# Patient Record
Sex: FEMALE | Race: WHITE | Employment: FULL TIME | URBAN - METROPOLITAN AREA
[De-identification: names, ages, dates, MRNs, and addresses within clinical notes are randomized per-mention and may not be internally consistent; named-entity substitution may affect disease eponyms.]

---

## 2017-03-28 ENCOUNTER — HOSPITAL ENCOUNTER (OUTPATIENT)
Dept: SURGERY | Age: 57
Discharge: HOME | End: 2017-03-28

## 2017-03-28 VITALS
BODY MASS INDEX: 27.16 KG/M2 | DIASTOLIC BLOOD PRESSURE: 88 MMHG | RESPIRATION RATE: 20 BRPM | TEMPERATURE: 98.3 F | HEART RATE: 100 BPM | OXYGEN SATURATION: 98 % | WEIGHT: 169 LBS | SYSTOLIC BLOOD PRESSURE: 133 MMHG | HEIGHT: 66 IN

## 2017-03-28 RX ORDER — BACLOFEN 20 MG
250 TABLET ORAL DAILY
COMMUNITY

## 2017-03-28 RX ORDER — LAMOTRIGINE 100 MG/1
100 TABLET ORAL
COMMUNITY

## 2017-03-28 RX ORDER — BISMUTH SUBSALICYLATE 262 MG
1 TABLET,CHEWABLE ORAL DAILY
COMMUNITY

## 2017-03-28 NOTE — PERIOP NOTES
Patient verified name, , and surgery as listed in [facility]. TYPE  CASE:1B  Orders per surgeon: none  Labs per surgeon:done at Dr Elliott Ards office today  Labs per anesthesia protocol: none  EKG  :  Patient denies any EKG in the past several years, she also denies any hx of chest pain, OROZCO or CAD      Patient provided with handouts including guide to surgery , transfusions, pain management and hand hygiene for the family and community. Pt verbalizes understanding of all pre-op instructions . Instructed that family must be present in building at all times. Hibiclens and instructions given per hospital policy. Instructed patient to continue  previous medications as prescribed prior to surgery and  to take the following medications the day of surgery according to anesthesia guidelines : none       Original medication prescription bottles vitamins visualized during patient appointment. Continue all previous medications unless otherwise directed. Instructed patient to hold  the following medications prior to surgery: patient takes her medications at hs      Patient verbalized understanding of all instructions and provided all medical/health information to the best of their ability.

## 2017-04-01 ENCOUNTER — ANESTHESIA EVENT (OUTPATIENT)
Dept: SURGERY | Age: 57
End: 2017-04-01
Payer: COMMERCIAL

## 2017-04-02 NOTE — H&P
Sindi Bajwa   History and physical    Subjective  Problem List:.   1. Right hand pain. 2. 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 3. right DeQuervain's tenosynovitis. 4. right knee pain/meniscal tear. This 64year old female presents today for evaluation of right knee pain. The patient comes in today for evaluation, history and physical, and surgical consent signing. The surgical procedure was reviewed in detail with the patient. The risks, including but not limited to anesthesia, infection, deep vein thrombosis, pulmonary embolus, injury to vessels, tendons and nerves, paralysis, stroke, heart attack, loss of limb, and death were discussed. The patient understands the post operative course and all questions were answered. No guarantees are made and all alternatives are given. The patient wishes to proceed with the surgery. Appropriate literature and relevant material was reviewed with the patient. Surgical procedure: right knee arthroscopy with menisectomy and microfracture. .     Allergies: Medrol dose dillon, codeine, Demerol, sulfa. .   Family health history: heart disease-father and grandparent, high cholesterol-mother. .   Major events: Heent-tonsils, hysterectomy. Ongoing medical problems: anxiety, depression, sleep apnea-uses CPAP, high cholesterol, osteoarthritis, migraines, visual problems. .   Social history: Patient does use tobacco and ETOH use occasionally. Patient is . Jaymie Rodriguez REVIEW OF SYSTEMS:.   General: Denies weight change, generally healthy,  change in strength or exercise tolerance. .   Head: Denies headaches,  vertigo,  injury. .   Eyes: Patient wears glasses. .   Ears:  Denies change in hearing,  tinnitus, bleeding, vertigo. .   Nose: Denies epistaxis,  coryza, obstruction,  discharge. .   Mouth: Denies dental difficulties,  gingival bleeding,  use of dentures. .   Chest: Denies dyspnea, wheezing, hemoptysis, cough. .   Heart: Denies chest pains,  palpitations, syncope,  orthopnea. .   Abdomen: Denies change in appetite,  dysphagia, abdominal pains, bowel habit changes, emesis, melena. .   : Denies urinary urgency,  dysuria, change in nature of urine. .   Neurologic: Denies weakness, denies tremor,  seizures, changes in mentation,  ataxia. Psychiatric: Denies depressive symptoms, changes in sleep habits,  changes in thought content. .     Objective  Vital signs: Height 65 inches; Weight 168 lbs; /98 mmHg; Temp 97.3 F; Pulse 110 bpm; Oxygen Saturation 95 %. .     Patient is a 64year old female who appears her given age and is in no apparent distress. Oriented to person, place, and time. Mood and affect are appropriate for age and situation. Assessment of respiratory effort reveals even and nonlabored respirations. .     GEN: NAD. Lungs clear to auscultation bilaterally. Heart rate regular without murmur heard. .     She exhibits a mildly antalgic, reciprocal gait. She is able to get onto and off of the examination table. Right knee MRI on 3-8-17 revealed:.   1. Osteochondral injury of the medial femoral condyle. .   2. Chondromalacia of the patella with evidence of chondral edema of the medial facet with moderate grade chondromalacia of the superior lateral quadrant of the patella with underlying mild patchy marrow edema. .   3. Free edge attenuation and body of the medial meniscus. .   4. Free edge tear of the lateral meniscus. .   5. No full thickness tear of the ACL or PCL. .   6. Small reactive joint effusion with a small amount of fluid in the popliteal pouch. .       Right knee examination:. Inspection reveals no external signs of acute injury or trauma. She has several well healed tattoos noted over the right lower leg. No palpable cords. No warmth or erythema noted. No effusion noted. Palpation reveals tenderness over the medial joint line, and with focal tenderness noted over the medial femoral condyle. Knee extension (active): 0 degrees, with pain.    Knee flexion (active): 115 degrees, with pain. The right knee is stable to stressing in all planes. Positive for patellofemoral crepitus with knee flexion/extension. Patella exam: normal tracking. Muscle tone is normal, without evidence of atrophy noted. She is able to bear full weight on her right knee. Neurologic: Sensation is intact and symmetrical in all dermatomes. .   Vascular: Peripheral pulses normal 2/2 lower extremities. .   Deep tendon reflexes in the lower extremities are normal and symmetrical.   Coordination is good. Assessment  right knee pain/internal derangement. right knee chondromalacia. medial meniscus attenuation. lateral meniscus tear. osteochondral injury, medial femoral condyle. DIAGNOSIS:        Other tear of lateral meniscus of right knee, current injury, sequela [ICD-10: S83.281S], [ICD-9: 905.7], [SNOMED: 781919082]        Other specified acquired deformities of musculoskeletal system [ICD-10: M95.8], [ICD-9: 738.8], [SNOMED: 99314956]        Medial meniscus derangement [ICD-10: M23.303], [ICD-9: 717.3], [SNOMED: 339750766]        Chondromalacia, right knee [ICD-10: M94.261], [ICD-9: 733.92], [SNOMED: 10914557]  Plan  We discussed the pathophysiology of the diagnosis and options for treatment. .     We have talked about the complications of surgery, including the possibility of damage to nerves, arteries, vessels and tendons, bleeding, infection, the possibility of sustaining medical problems, even death. We have talked about the possibility that the condition may not improve after surgery  or that it could actually be worse. She seems to understand and accept these possible complications. .       All questions answered at this time. Patient knows to contact the office with any questions or concerns. .     VERIFICATION OF ANCILLARY DOCUMENTATION:  The portions of the chart completed by ancillary personnel were reviewed by the physician. .     RTC: post-op. Arvell Jude      MEDICATIONS:        Cymbalta 60 MG Oral Capsule Delayed Release Particles 1 capsule (60 mg) orally daily                    prescription:   not prescribed this visit        Cyclobenzaprine HCl 10 MG Oral Tablet as needed                    prescription:   not prescribed this visit        Magnesium 200 MG Oral Tablet one daily                    prescription:   not prescribed this visit        Multivitamins Oral Capsule one daily                    prescription:   not prescribed this visit        ZyrTEC Allergy 10 MG Oral Tablet one daily                    prescription:   not prescribed this visit        LaMICtal 100 MG Oral Tablet one daily                    prescription:   not prescribed this visit

## 2017-04-04 ENCOUNTER — HOSPITAL ENCOUNTER (OUTPATIENT)
Age: 57
Setting detail: OUTPATIENT SURGERY
Discharge: HOME | End: 2017-04-04
Attending: ORTHOPAEDIC SURGERY | Admitting: ORTHOPAEDIC SURGERY
Payer: COMMERCIAL

## 2017-04-04 ENCOUNTER — ANESTHESIA (OUTPATIENT)
Dept: SURGERY | Age: 57
End: 2017-04-04
Payer: COMMERCIAL

## 2017-04-04 VITALS
RESPIRATION RATE: 16 BRPM | HEART RATE: 98 BPM | TEMPERATURE: 98.6 F | SYSTOLIC BLOOD PRESSURE: 126 MMHG | BODY MASS INDEX: 27.44 KG/M2 | DIASTOLIC BLOOD PRESSURE: 82 MMHG | WEIGHT: 170 LBS | OXYGEN SATURATION: 94 %

## 2017-04-04 PROCEDURE — 76210000063 HC OR PH I REC FIRST 0.5 HR: Performed by: ORTHOPAEDIC SURGERY

## 2017-04-04 PROCEDURE — 76060000032 HC ANESTHESIA 0.5 TO 1 HR: Performed by: ORTHOPAEDIC SURGERY

## 2017-04-04 PROCEDURE — 77030018836 HC SOL IRR NACL ICUM -A: Performed by: ORTHOPAEDIC SURGERY

## 2017-04-04 PROCEDURE — 77030002916 HC SUT ETHLN J&J -A: Performed by: ORTHOPAEDIC SURGERY

## 2017-04-04 PROCEDURE — 74011250636 HC RX REV CODE- 250/636

## 2017-04-04 PROCEDURE — 74011250637 HC RX REV CODE- 250/637: Performed by: ANESTHESIOLOGY

## 2017-04-04 PROCEDURE — 77030020753 HC CUF TRNQT 1BLA STRY -B: Performed by: ORTHOPAEDIC SURGERY

## 2017-04-04 PROCEDURE — 77030019940 HC BLNKT HYPOTHRM STRY -B: Performed by: ANESTHESIOLOGY

## 2017-04-04 PROCEDURE — 74011250636 HC RX REV CODE- 250/636: Performed by: ORTHOPAEDIC SURGERY

## 2017-04-04 PROCEDURE — 77030022036 HC BLD SHV TOMCAT STRY -B: Performed by: ORTHOPAEDIC SURGERY

## 2017-04-04 PROCEDURE — 77030020143 HC AIRWY LARYN INTUB CGAS -A: Performed by: ANESTHESIOLOGY

## 2017-04-04 PROCEDURE — 74011000250 HC RX REV CODE- 250

## 2017-04-04 PROCEDURE — 77030029203 HC IRR KT CYSTO/TUR BBMI -A: Performed by: ORTHOPAEDIC SURGERY

## 2017-04-04 PROCEDURE — 76010000138 HC OR TIME 0.5 TO 1 HR: Performed by: ORTHOPAEDIC SURGERY

## 2017-04-04 PROCEDURE — 74011000250 HC RX REV CODE- 250: Performed by: ANESTHESIOLOGY

## 2017-04-04 PROCEDURE — 74011250636 HC RX REV CODE- 250/636: Performed by: ANESTHESIOLOGY

## 2017-04-04 PROCEDURE — 77030019908 HC STETH ESOPH SIMS -A: Performed by: ANESTHESIOLOGY

## 2017-04-04 PROCEDURE — 76210000020 HC REC RM PH II FIRST 0.5 HR: Performed by: ORTHOPAEDIC SURGERY

## 2017-04-04 RX ORDER — SODIUM CHLORIDE, SODIUM LACTATE, POTASSIUM CHLORIDE, CALCIUM CHLORIDE 600; 310; 30; 20 MG/100ML; MG/100ML; MG/100ML; MG/100ML
75 INJECTION, SOLUTION INTRAVENOUS CONTINUOUS
Status: DISCONTINUED | OUTPATIENT
Start: 2017-04-04 | End: 2017-04-04 | Stop reason: HOSPADM

## 2017-04-04 RX ORDER — FENTANYL CITRATE 50 UG/ML
INJECTION, SOLUTION INTRAMUSCULAR; INTRAVENOUS AS NEEDED
Status: DISCONTINUED | OUTPATIENT
Start: 2017-04-04 | End: 2017-04-04 | Stop reason: HOSPADM

## 2017-04-04 RX ORDER — ONDANSETRON 2 MG/ML
INJECTION INTRAMUSCULAR; INTRAVENOUS AS NEEDED
Status: DISCONTINUED | OUTPATIENT
Start: 2017-04-04 | End: 2017-04-04 | Stop reason: HOSPADM

## 2017-04-04 RX ORDER — KETOROLAC TROMETHAMINE 30 MG/ML
INJECTION, SOLUTION INTRAMUSCULAR; INTRAVENOUS AS NEEDED
Status: DISCONTINUED | OUTPATIENT
Start: 2017-04-04 | End: 2017-04-04 | Stop reason: HOSPADM

## 2017-04-04 RX ORDER — MIDAZOLAM HYDROCHLORIDE 1 MG/ML
2 INJECTION, SOLUTION INTRAMUSCULAR; INTRAVENOUS
Status: DISCONTINUED | OUTPATIENT
Start: 2017-04-04 | End: 2017-04-04 | Stop reason: HOSPADM

## 2017-04-04 RX ORDER — EPINEPHRINE 1 MG/ML
INJECTION, SOLUTION, CONCENTRATE INTRAVENOUS AS NEEDED
Status: DISCONTINUED | OUTPATIENT
Start: 2017-04-04 | End: 2017-04-04 | Stop reason: HOSPADM

## 2017-04-04 RX ORDER — LIDOCAINE HYDROCHLORIDE 10 MG/ML
0.1 INJECTION INFILTRATION; PERINEURAL AS NEEDED
Status: DISCONTINUED | OUTPATIENT
Start: 2017-04-04 | End: 2017-04-04 | Stop reason: HOSPADM

## 2017-04-04 RX ORDER — LIDOCAINE HYDROCHLORIDE 20 MG/ML
INJECTION, SOLUTION EPIDURAL; INFILTRATION; INTRACAUDAL; PERINEURAL AS NEEDED
Status: DISCONTINUED | OUTPATIENT
Start: 2017-04-04 | End: 2017-04-04 | Stop reason: HOSPADM

## 2017-04-04 RX ORDER — NALOXONE HYDROCHLORIDE 0.4 MG/ML
0.2 INJECTION, SOLUTION INTRAMUSCULAR; INTRAVENOUS; SUBCUTANEOUS AS NEEDED
Status: DISCONTINUED | OUTPATIENT
Start: 2017-04-04 | End: 2017-04-04 | Stop reason: HOSPADM

## 2017-04-04 RX ORDER — ROPIVACAINE HYDROCHLORIDE 5 MG/ML
INJECTION, SOLUTION EPIDURAL; INFILTRATION; PERINEURAL AS NEEDED
Status: DISCONTINUED | OUTPATIENT
Start: 2017-04-04 | End: 2017-04-04 | Stop reason: HOSPADM

## 2017-04-04 RX ORDER — SODIUM CHLORIDE 0.9 % (FLUSH) 0.9 %
5-10 SYRINGE (ML) INJECTION AS NEEDED
Status: DISCONTINUED | OUTPATIENT
Start: 2017-04-04 | End: 2017-04-04 | Stop reason: HOSPADM

## 2017-04-04 RX ORDER — FAMOTIDINE 20 MG/1
20 TABLET, FILM COATED ORAL ONCE
Status: COMPLETED | OUTPATIENT
Start: 2017-04-04 | End: 2017-04-04

## 2017-04-04 RX ORDER — PROPOFOL 10 MG/ML
INJECTION, EMULSION INTRAVENOUS AS NEEDED
Status: DISCONTINUED | OUTPATIENT
Start: 2017-04-04 | End: 2017-04-04 | Stop reason: HOSPADM

## 2017-04-04 RX ORDER — OXYCODONE AND ACETAMINOPHEN 5; 325 MG/1; MG/1
1 TABLET ORAL AS NEEDED
Status: DISCONTINUED | OUTPATIENT
Start: 2017-04-04 | End: 2017-04-04 | Stop reason: HOSPADM

## 2017-04-04 RX ORDER — HYDROMORPHONE HYDROCHLORIDE 2 MG/ML
0.5 INJECTION, SOLUTION INTRAMUSCULAR; INTRAVENOUS; SUBCUTANEOUS
Status: DISCONTINUED | OUTPATIENT
Start: 2017-04-04 | End: 2017-04-04 | Stop reason: HOSPADM

## 2017-04-04 RX ADMIN — SODIUM CHLORIDE 3 MG: 9 INJECTION INTRAMUSCULAR; INTRAVENOUS; SUBCUTANEOUS at 09:01

## 2017-04-04 RX ADMIN — MIDAZOLAM HYDROCHLORIDE 2 MG: 1 INJECTION, SOLUTION INTRAMUSCULAR; INTRAVENOUS at 06:55

## 2017-04-04 RX ADMIN — FENTANYL CITRATE 50 MCG: 50 INJECTION, SOLUTION INTRAMUSCULAR; INTRAVENOUS at 07:37

## 2017-04-04 RX ADMIN — SODIUM CHLORIDE, SODIUM LACTATE, POTASSIUM CHLORIDE, AND CALCIUM CHLORIDE 75 ML/HR: 600; 310; 30; 20 INJECTION, SOLUTION INTRAVENOUS at 06:02

## 2017-04-04 RX ADMIN — FAMOTIDINE 20 MG: 20 TABLET, FILM COATED ORAL at 06:02

## 2017-04-04 RX ADMIN — PROPOFOL 200 MG: 10 INJECTION, EMULSION INTRAVENOUS at 07:37

## 2017-04-04 RX ADMIN — LIDOCAINE HYDROCHLORIDE 40 MG: 20 INJECTION, SOLUTION EPIDURAL; INFILTRATION; INTRACAUDAL; PERINEURAL at 07:37

## 2017-04-04 RX ADMIN — KETOROLAC TROMETHAMINE 30 MG: 30 INJECTION, SOLUTION INTRAMUSCULAR; INTRAVENOUS at 08:17

## 2017-04-04 RX ADMIN — ONDANSETRON 4 MG: 2 INJECTION INTRAMUSCULAR; INTRAVENOUS at 08:16

## 2017-04-04 NOTE — OP NOTES
[facility] 65   OPERATIVE REPORT       Name:  Wyatt Castano   MR#:  [id number]   :  1960   Account #:  [de-identified]   Date of Adm:  2017       DATE OF SURGERY: 2017     PREOPERATIVE DIAGNOSIS: Right knee medial femoral condyle   chondromalacia. POSTPROCEDURE DIAGNOSES:    1. Right knee grade 3-4 chondromalacia over defined area. 2. Lateral meniscus degenerative tear. PROCEDURE: Diagnostic and therapeutic arthroscopy with right   knee arthroscopy with partial lateral meniscectomy and medial   femoral condyle cartilage abrasion and microfracture with drill   and microfracture tool. ANESTHESIA: General.    SURGEON: Tran Rod MD    INDICATIONS: Ms. Freddy Hernández is a 51-year-old female with a history of   painful right knee. MRI findings consistent with significant   chondromalacia of the medial femoral condyle. We discussed   treatment options and felt that a microfracture technique would   be about the only suitable treatment for this. She understands   and wishes to proceed. She understands that this may improve or   actually make her pain worse. Pain could scar cartilage in this   area or not and the ultimate outcome may be knee replacement. She understands and wishes to proceed. She also understands the   risks to include infection, blood clots, nerve and tendon   damage, and other less common potentially issues, complication   may occur. Informed consent was obtained. PROCEDURE IN DETAIL: The patient was seen in the preoperative   area; her knee was marked. We discussed any concerns she had. She was taken to operating room #7, successful anesthesia was   achieved. No antibiotics were indicated for this procedure. A   time-out was performed, once the leg was prepped and draped into   a sterile field. Once time-out was confirmed by the operative   team, we exsanguinated with a 6 inch sterile Esmarch, tourniquet   was inflated to 300 mmHg.  2 portals were made after instillation   of half of the 30 mL of Naropin with epinephrine, 15 mL in divided   doses around the proposed portal sites and intraarticularly. Portals were made anterolateral and anteromedial at the level of   the joint. The knee was inspected in a systematic fashion. No   significant pathology seen in the lateral gutter or the lateral   suprapatellar pouch area or the medial suprapatellar pouch area. The lateral femoral condyle had some grade 1 chondromalacia. There was a degenerative tear at the junction of the anterior   and posterior horns which did extend up about a third of the way   into the body of the meniscus. This debrided down alternating with   straight baskets and a mechanized shaver. There was no   significant chondromalacia seen on the lateral tibial articular   surface. The ACL was visualized, stressed, and probed and found   to be intact, as well as the PCL. We placed the scope on to the   medial femoral condyle. The medial tibial articular surface was   intact. The medial meniscus was intact to probing and   visualization. There was a significant area, approximately 8 x 6   mm in size of grade 3-4 chondromalacia with large flaps   extending down to the bone. We debrided these loose flaps with   the mechanized 3.5 mm shaver and then we drilled this 0.045 wire   and with the microfracture awl. Once we removed bony debris and   cartilage from this area down to bone, we had approximately 7 to   8 small puncture areas in the cortical bone. She had some   chondromalacia deep in the trochlear groove. No significant   flaps. This was probed as well. No other areas noted. The medial   gutter was free of any significant pathology and we thoroughly   irrigated the knee and then instilled the remaining 15 mL of   Naropin with epinephrine for a total of 30 mL. We then closed the portals with simple sutures of 3-0 Dermalon   plus a sterile bulky dressing.  The tourniquet was released,   taken to the recovery room in satisfactory condition. She will be discharged to followup in my office in 10 days, call   sooner if any problems. Given appropriate pain medication   prescriptions.         MD Robb Silva / Immanuel Nieto   D:  04/04/2017   08:29   T:  04/04/2017   08:53   Job #:  [id number]

## 2017-04-04 NOTE — PROGRESS NOTES
's Pre-op visit requested by patient. Conveyed care and concern for patient and family. Offered prayer as requested for patient, family, and staff.     Mae Funes MDiv, BS  Board Certified

## 2017-04-04 NOTE — H&P
History and Physical Updated with no interval change. Rosita Victor MD History and Physical Updated with no interval change.  Rosita Victor MD

## 2017-04-04 NOTE — ANESTHESIA POSTPROCEDURE EVALUATION
Post-Anesthesia Evaluation and Assessment    Patient: Umair Duenas MRN: [id number]  SSN: [id number]    YOB: 1960  Age: 64 y.o. Sex: female       Cardiovascular Function/Vital Signs  Visit Vitals    /82    Pulse 98    Temp 37 °C (98.6 °F)    Resp 16    Wt 77.1 kg (170 lb)    SpO2 94%    BMI 27.44 kg/m2       Patient is status post general anesthesia for Procedure(s):  RIGHT  KNEE ARTHROSCOPY WITH MEDIAL AND LATERAL MENISECTOMY AND MICROFRACTURE . Nausea/Vomiting: None    Postoperative hydration reviewed and adequate. Pain:  Pain Scale 1: Numeric (0 - 10) (04/04/17 0844)  Pain Intensity 1: 0 (04/04/17 0844)   Managed    Neurological Status:   Neuro (WDL): Within Defined Limits (04/04/17 0844)  Neuro  Neurologic State: Alert (04/04/17 0844)  LUE Motor Response: Purposeful (04/04/17 0844)  LLE Motor Response: Purposeful (04/04/17 0844)  RUE Motor Response: Purposeful (04/04/17 0844)  RLE Motor Response: Purposeful (04/04/17 0844)   At baseline    Mental Status and Level of Consciousness: Arousable    Pulmonary Status:   O2 Device: Room air (04/04/17 0904)   Adequate oxygenation and airway patent    Complications related to anesthesia: None    Post-anesthesia assessment completed.  No concerns    Signed By: Linda Olson MD     April 4, 2017

## 2017-04-04 NOTE — DISCHARGE INSTRUCTIONS
[address] [facility] [place][note 2] Discharge Instructions Outpatient Knee    · Your follow-up appointment has already been scheduled. Please refer to your appointment card. · Pain medicine prescription is on the chart and will be given to you by the nurse  · Ice and elevate your knee for twenty-four (24) hours  · You may remove your dressing in seventy-two (72) hours, and shower  · Call [phone] for any questions or problems  -----Touch down weight bearing with crutches or walker      ACTIVITY  · As tolerated and as directed by your doctor. · Bathe or shower as directed by your doctor. DIET  · Clear liquids until no nausea or vomiting; then light diet for the first day. · Advance to regular diet on second day, unless your doctor orders otherwise. · If nausea and vomiting continues, call your doctor. PAIN  · Take pain medication as directed by your doctor. · Call your doctor if pain is NOT relieved by medication. · DO NOT take aspirin of blood thinners unless directed by your doctor. DRESSING CARE       CALL YOUR DOCTOR IF   · Excessive bleeding that does not stop after holding pressure over the area  · Temperature of 101 degrees F or above  · Excessive redness, swelling or bruising, and/ or green or yellow, smelly discharge from incision    AFTER ANESTHESIA   · For the first 24 hours: DO NOT Drive, Drink alcoholic beverages, or Make important decisions. · Be aware of dizziness following anesthesia and while taking pain medication.      APPOINTMENT DATE/ TIME    YOUR DOCTOR'S PHONE NUMBER       DISCHARGE SUMMARY from Nurse    PATIENT INSTRUCTIONS:    After general anesthesia or intravenous sedation, for 24 hours or while taking prescription Narcotics:  · Limit your activities  · Do not drive and operate hazardous machinery  · Do not make important personal or business decisions  · Do  not drink alcoholic beverages  · If you have not urinated within 8 hours after discharge, please contact your surgeon on call. *  Please give a list of your current medications to your Primary Care Provider. *  Please update this list whenever your medications are discontinued, doses are      changed, or new medications (including over-the-counter products) are added. *  Please carry medication information at all times in case of emergency situations. These are general instructions for a healthy lifestyle:    No smoking/ No tobacco products/ Avoid exposure to second hand smoke    Surgeon General's Warning:  Quitting smoking now greatly reduces serious risk to your health. Obesity, smoking, and sedentary lifestyle greatly increases your risk for illness    A healthy diet, regular physical exercise & weight monitoring are important for maintaining a healthy lifestyle    You may be retaining fluid if you have a history of heart failure or if you experience any of the following symptoms:  Weight gain of 3 pounds or more overnight or 5 pounds in a week, increased swelling in our hands or feet or shortness of breath while lying flat in bed. Please call your doctor as soon as you notice any of these symptoms; do not wait until your next office visit. Recognize signs and symptoms of STROKE:    F-face looks uneven    A-arms unable to move or move unevenly    S-speech slurred or non-existent    T-time-call 911 as soon as signs and symptoms begin-DO NOT go       Back to bed or wait to see if you get better-TIME IS BRAIN.

## 2017-04-04 NOTE — ANESTHESIA PREPROCEDURE EVALUATION
Anesthetic History   No history of anesthetic complications            Review of Systems / Medical History  Patient summary reviewed, nursing notes reviewed and pertinent labs reviewed    Pulmonary        Sleep apnea: No treatment  Smoker         Neuro/Psych         Psychiatric history     Cardiovascular                  Exercise tolerance: >4 METS     GI/Hepatic/Renal                Endo/Other             Other Findings              Physical Exam    Airway  Mallampati: II  TM Distance: 4 - 6 cm  Neck ROM: normal range of motion   Mouth opening: Normal     Cardiovascular    Rhythm: regular           Dental    Dentition: Full upper dentures     Pulmonary  Breath sounds clear to auscultation               Abdominal         Other Findings            Anesthetic Plan    ASA: 2  Anesthesia type: general          Induction: Intravenous  Anesthetic plan and risks discussed with: Patient

## 2017-04-04 NOTE — IP AVS SNAPSHOT
Maykel Campbell 
 
 
 [address] 
[phone] Patient: Bossman Loredo MRN: [id number] :1960 You are allergic to the following Allergen Reactions Codeine Hives Dilaudid (Hydromorphone (Bulk)) Nausea and Vomiting Prednisone Nausea and Vomiting  
    
 Sulfa (Sulfonamide Antibiotics) Other (comments) Yeast infection Recent Documentation Weight BMI OB Status Smoking Status 77.1 kg 27.44 kg/m2 Hysterectomy Former Smoker Emergency Contacts Name Discharge Info Relation Home Work Mobile Veronika Mccarty DISCHARGE CAREGIVER [3] Other Relative [6] [phone] About your hospitalization You were admitted on:  2017 You last received care in the:  [facility] OP PACU You were discharged on:  2017 Unit phone number:  [phone] Why you were hospitalized Your primary diagnosis was:  Not on File Providers Seen During Your Hospitalizations Provider Role Specialty Primary office phone Enedelia Corona MD Attending Provider Orthopedic Surgery [phone] Your Primary Care Physician (PCP) Primary Care Physician Office Phone Office Fax NONE ** None ** ** None ** Follow-up Information Follow up With Details Comments Contact Info None   None (395) Patient stated that they have no PCP Current Discharge Medication List  
  
CONTINUE these medications which have CHANGED Dose & Instructions Dispensing Information Comments Morning Noon Evening Bedtime DULoxetine 60 mg capsule Commonly known as:  CYMBALTA What changed:  when to take this Your last dose was: Your next dose is:    
   
   
 Dose:  60 mg Take 1 Cap by mouth daily. Quantity:  30 Cap Refills:  5 CONTINUE these medications which have NOT CHANGED Dose & Instructions Dispensing Information Comments Morning Noon Evening Bedtime FLEXERIL PO Your last dose was: Your next dose is:    
   
   
 Dose:  10 mg Take 10 mg by mouth as needed. Refills:  0 LaMICtal 100 mg tablet Generic drug:  lamoTRIgine Your last dose was: Your next dose is:    
   
   
 Dose:  100 mg Take 100 mg by mouth nightly. Indications: BIPOLAR DISORDER IN REMISSION Refills:  0  
     
   
   
   
  
 magnesium oxide 500 mg Tab Your last dose was: Your next dose is: Take  by mouth daily. Refills:  0  
     
   
   
   
  
 multivitamin tablet Commonly known as:  ONE A DAY Your last dose was: Your next dose is:    
   
   
 Dose:  1 Tab Take 1 Tab by mouth daily. Refills:  0 ZyrTEC 10 mg tablet Generic drug:  cetirizine Your last dose was: Your next dose is: Take  by mouth daily. Refills:  0 Discharge Instructions [address] Discharge Instructions Outpatient Knee · Your follow-up appointment has already been scheduled. Please refer to your appointment card. · Pain medicine prescription is on the chart and will be given to you by the nurse · Ice and elevate your knee for twenty-four (24) hours · You may remove your dressing in seventy-two (72) hours, and shower · Call [phone] for any questions or problems 
-----Touch down weight bearing with crutches or walker ACTIVITY · As tolerated and as directed by your doctor. · Bathe or shower as directed by your doctor. DIET · Clear liquids until no nausea or vomiting; then light diet for the first day. · Advance to regular diet on second day, unless your doctor orders otherwise. · If nausea and vomiting continues, call your doctor. PAIN 
· Take pain medication as directed by your doctor. · Call your doctor if pain is NOT relieved by medication. · DO NOT take aspirin of blood thinners unless directed by your doctor. DRESSING CARE  
 
 
CALL YOUR DOCTOR IF  
· Excessive bleeding that does not stop after holding pressure over the area · Temperature of 101 degrees F or above · Excessive redness, swelling or bruising, and/ or green or yellow, smelly discharge from incision AFTER ANESTHESIA · For the first 24 hours: DO NOT Drive, Drink alcoholic beverages, or Make important decisions. · Be aware of dizziness following anesthesia and while taking pain medication. APPOINTMENT DATE/ TIME 
 
YOUR DOCTOR'S PHONE NUMBER  
 
 
DISCHARGE SUMMARY from Nurse PATIENT INSTRUCTIONS: 
 
After general anesthesia or intravenous sedation, for 24 hours or while taking prescription Narcotics: · Limit your activities · Do not drive and operate hazardous machinery · Do not make important personal or business decisions · Do  not drink alcoholic beverages · If you have not urinated within 8 hours after discharge, please contact your surgeon on call. *  Please give a list of your current medications to your Primary Care Provider. *  Please update this list whenever your medications are discontinued, doses are 
    changed, or new medications (including over-the-counter products) are added. *  Please carry medication information at all times in case of emergency situations. These are general instructions for a healthy lifestyle: No smoking/ No tobacco products/ Avoid exposure to second hand smoke Surgeon General's Warning:  Quitting smoking now greatly reduces serious risk to your health. Obesity, smoking, and sedentary lifestyle greatly increases your risk for illness A healthy diet, regular physical exercise & weight monitoring are important for maintaining a healthy lifestyle You may be retaining fluid if you have a history of heart failure or if you experience any of the following symptoms:  Weight gain of 3 pounds or more overnight or 5 pounds in a week, increased swelling in our hands or feet or shortness of breath while lying flat in bed. Please call your doctor as soon as you notice any of these symptoms; do not wait until your next office visit. Recognize signs and symptoms of STROKE: 
 
F-face looks uneven A-arms unable to move or move unevenly S-speech slurred or non-existent T-time-call 911 as soon as signs and symptoms begin-DO NOT go Back to bed or wait to see if you get better-TIME IS BRAIN. Discharge Orders None Introducing [facility]! Dear Jennifer Singh: Thank you for requesting a wishkicker account. Our records indicate that you already have an active wishkicker account. You can access your account anytime at [email or web address] [email or web address] Did you know that you can access your hospital and ER discharge instructions at any time in wishkicker? You can also review all of your test results from your hospital stay or ER visit. Additional Information If you have questions, please visit the Frequently Asked Questions section of the wishkicker website at [email or web address] [email or web address]/. Remember, wishkicker is NOT to be used for urgent needs. For medical emergencies, dial 911. Now available from your iPhone and Android! General Information Please provide this summary of care documentation to your next provider. Patient Signature:  ____________________________________________________________ Date:  ____________________________________________________________  
  
Cyrus Kiran Provider Signature:  ____________________________________________________________ Date:  ____________________________________________________________

## 2017-04-04 NOTE — BRIEF OP NOTE
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]  Postoperative Diagnosis: Acute lateral meniscal tear, right, sequela / Degeneration disease of medial meniscus, right /     Procedure(s):  RIGHT  KNEE ARTHROSCOPY WITH  LATERAL MENISECTOMY AND MICROFRACTURE of the [address]  Surgeon(s) and Role:     * Dimas Chong MD - Primary            Surgical Staff:  Circ-1: Christy Reddy RN  Scrub Tech-1: Mirian Hernandez  Event Time In   Incision Start 0690   Incision Close 0819     Anesthesia: General   Estimated Blood Loss: minimal   Specimens: * No specimens in log *   Findings: as above   Complications: none noted  Implants: * No implants in log *

## 2017-04-04 NOTE — IP AVS SNAPSHOT
Summary of Care Report The Summary of Care report has been created to help improve care coordination. Users with access to [employer] or [address] (Web-based application) may access additional patient information including the Discharge Summary. If you are not currently a [address] user and need more information, please call the number listed below in the [place] [address] section and ask to be connected with Medical Records. Facility Information Name Address Phone [address] [address] [address] [phone] Patient Information Patient Name Sex KAMLA Sutton ([id number]) Female 1960 Discharge Information Admitting Provider Service Area Unit Awilda Pereira MD / [address] / [phone] Discharge Provider Discharge Date/Time Discharge Disposition Destination (none) 2017 Morning (Pending) AHR (none) Patient Language Language ENGLISH [13] Hospital Problems as of 2017  Reviewed: 2014  8:12 AM by Caroline Cook None Non-Hospital Problems as of 2017  Reviewed: 2014  8:12 AM by Caroline Cook Class Noted - Resolved Last Modified Active Problems Generalized anxiety disorder  Unknown - Present 2014 by Caroline Cook Entered by Caroline Cook Depression  Unknown - Present 2014 by Caroline Cook Entered by Caroline Cook Allergic rhinitis, cause unspecified  Unknown - Present 2014 by Caroline Cook Entered by Caroline Cook Sleep apnea, obstructive  Unknown - Present 2014 by Caroline Cook Entered by Caroline Cook Overview Signed 2014  5:42 PM by Caroline Cook She sleeps with a CPAP. Tobacco use disorder  Unknown - Present 2014 by Caroline Cook Entered by Almas Estes Overview Signed 7/18/2014  5:43 PM by Almas Estes She tried Chantix and it caused her to have nausea, chest tightness and heartburn. You are allergic to the following Allergen Reactions Codeine Hives Dilaudid (Hydromorphone (Bulk)) Nausea and Vomiting Prednisone Nausea and Vomiting  
    
 Sulfa (Sulfonamide Antibiotics) Other (comments) Yeast infection Current Discharge Medication List  
  
CONTINUE these medications which have CHANGED Dose & Instructions Dispensing Information Comments DULoxetine 60 mg capsule Commonly known as:  CYMBALTA What changed:  when to take this Dose:  60 mg Take 1 Cap by mouth daily. Quantity:  30 Cap Refills:  5 CONTINUE these medications which have NOT CHANGED Dose & Instructions Dispensing Information Comments FLEXERIL PO Dose:  10 mg Take 10 mg by mouth as needed. Refills:  0 LaMICtal 100 mg tablet Generic drug:  lamoTRIgine Dose:  100 mg Take 100 mg by mouth nightly. Indications: BIPOLAR DISORDER IN REMISSION Refills:  0  
   
 magnesium oxide 500 mg Tab Take  by mouth daily. Refills:  0  
   
 multivitamin tablet Commonly known as:  ONE A DAY Dose:  1 Tab Take 1 Tab by mouth daily. Refills:  0 ZyrTEC 10 mg tablet Generic drug:  cetirizine Take  by mouth daily. Refills:  0 Surgery Information ID Date/Time Status Primary Surgeon All Procedures Location [id number] 4/4/2017 0730 Holley Bejarano MD RIGHT  KNEE ARTHROSCOPY WITH MEDIAL AND LATERAL MENISECTOMY AND MICROFRACTURE  [facility] Follow-up Information Follow up With Details Comments Contact Info None   None (395) Patient stated that they have no PCP Discharge Instructions [address] [facility] Discharge Instructions Outpatient Knee · Your follow-up appointment has already been scheduled. Please refer to your appointment card. · Pain medicine prescription is on the chart and will be given to you by the nurse · Ice and elevate your knee for twenty-four (24) hours · You may remove your dressing in seventy-two (72) hours, and shower · Call [phone] for any questions or problems 
-----Touch down weight bearing with crutches or walker ACTIVITY · As tolerated and as directed by your doctor. · Bathe or shower as directed by your doctor. DIET · Clear liquids until no nausea or vomiting; then light diet for the first day. · Advance to regular diet on second day, unless your doctor orders otherwise. · If nausea and vomiting continues, call your doctor. PAIN 
· Take pain medication as directed by your doctor. · Call your doctor if pain is NOT relieved by medication. · DO NOT take aspirin of blood thinners unless directed by your doctor. DRESSING CARE  
 
 
CALL YOUR DOCTOR IF  
· Excessive bleeding that does not stop after holding pressure over the area · Temperature of 101 degrees F or above · Excessive redness, swelling or bruising, and/ or green or yellow, smelly discharge from incision AFTER ANESTHESIA · For the first 24 hours: DO NOT Drive, Drink alcoholic beverages, or Make important decisions. · Be aware of dizziness following anesthesia and while taking pain medication. APPOINTMENT DATE/ TIME 
 
YOUR DOCTOR'S PHONE NUMBER  
 
 
DISCHARGE SUMMARY from Nurse PATIENT INSTRUCTIONS: 
 
After general anesthesia or intravenous sedation, for 24 hours or while taking prescription Narcotics: · Limit your activities · Do not drive and operate hazardous machinery · Do not make important personal or business decisions · Do  not drink alcoholic beverages · If you have not urinated within 8 hours after discharge, please contact your surgeon on call. *  Please give a list of your current medications to your Primary Care Provider. *  Please update this list whenever your medications are discontinued, doses are 
    changed, or new medications (including over-the-counter products) are added. *  Please carry medication information at all times in case of emergency situations. These are general instructions for a healthy lifestyle: No smoking/ No tobacco products/ Avoid exposure to second hand smoke Surgeon General's Warning:  Quitting smoking now greatly reduces serious risk to your health. Obesity, smoking, and sedentary lifestyle greatly increases your risk for illness A healthy diet, regular physical exercise & weight monitoring are important for maintaining a healthy lifestyle You may be retaining fluid if you have a history of heart failure or if you experience any of the following symptoms:  Weight gain of 3 pounds or more overnight or 5 pounds in a week, increased swelling in our hands or feet or shortness of breath while lying flat in bed. Please call your doctor as soon as you notice any of these symptoms; do not wait until your next office visit. Recognize signs and symptoms of STROKE: 
 
F-face looks uneven A-arms unable to move or move unevenly S-speech slurred or non-existent T-time-call 911 as soon as signs and symptoms begin-DO NOT go Back to bed or wait to see if you get better-TIME IS BRAIN. Chart Review Routing History No Routing History on File

## 2017-08-06 ENCOUNTER — HOSPITAL ENCOUNTER (OUTPATIENT)
Dept: SLEEP MEDICINE | Age: 57
Discharge: HOME | End: 2017-08-06
Payer: COMMERCIAL

## 2017-08-06 PROCEDURE — 95810 POLYSOM 6/> YRS 4/> PARAM: CPT

## 2018-09-02 ENCOUNTER — APPOINTMENT (OUTPATIENT)
Dept: CT IMAGING | Age: 58
End: 2018-09-02
Attending: EMERGENCY MEDICINE
Payer: COMMERCIAL

## 2018-09-02 ENCOUNTER — HOSPITAL ENCOUNTER (EMERGENCY)
Age: 58
Discharge: HOME | End: 2018-09-02
Attending: EMERGENCY MEDICINE
Payer: COMMERCIAL

## 2018-09-02 VITALS
WEIGHT: 180 LBS | HEART RATE: 70 BPM | HEIGHT: 66 IN | BODY MASS INDEX: 28.93 KG/M2 | OXYGEN SATURATION: 97 % | SYSTOLIC BLOOD PRESSURE: 109 MMHG | RESPIRATION RATE: 16 BRPM | DIASTOLIC BLOOD PRESSURE: 56 MMHG | TEMPERATURE: 97.5 F

## 2018-09-02 DIAGNOSIS — R51.9 ACUTE NONINTRACTABLE HEADACHE, UNSPECIFIED HEADACHE TYPE: Primary | ICD-10-CM

## 2018-09-02 LAB
ALBUMIN SERPL-MCNC: 4.1 G/DL (ref 3.5–5)
ALBUMIN/GLOB SERPL: 1.3 {RATIO} (ref 1.2–3.5)
ALP SERPL-CCNC: 54 U/L (ref 50–136)
ALT SERPL-CCNC: 26 U/L (ref 12–65)
ANION GAP SERPL CALC-SCNC: 3 MMOL/L (ref 7–16)
AST SERPL-CCNC: 37 U/L (ref 15–37)
BASOPHILS # BLD: 0.1 K/UL (ref 0–0.2)
BASOPHILS NFR BLD: 1 % (ref 0–2)
BILIRUB SERPL-MCNC: 0.4 MG/DL (ref 0.2–1.1)
BUN SERPL-MCNC: 24 MG/DL (ref 6–23)
CALCIUM SERPL-MCNC: 9.2 MG/DL (ref 8.3–10.4)
CHLORIDE SERPL-SCNC: 103 MMOL/L (ref 98–107)
CO2 SERPL-SCNC: 28 MMOL/L (ref 21–32)
CREAT SERPL-MCNC: 1.27 MG/DL (ref 0.6–1)
DIFFERENTIAL METHOD BLD: NORMAL
EOSINOPHIL # BLD: 0.5 K/UL (ref 0–0.8)
EOSINOPHIL NFR BLD: 6 % (ref 0.5–7.8)
ERYTHROCYTE [DISTWIDTH] IN BLOOD BY AUTOMATED COUNT: 13.6 %
GLOBULIN SER CALC-MCNC: 3.2 G/DL (ref 2.3–3.5)
GLUCOSE SERPL-MCNC: 88 MG/DL (ref 65–100)
HCT VFR BLD AUTO: 42.8 % (ref 35.8–46.3)
HGB BLD-MCNC: 14.1 G/DL (ref 11.7–15.4)
IMM GRANULOCYTES # BLD: 0 K/UL (ref 0–0.5)
IMM GRANULOCYTES NFR BLD AUTO: 0 % (ref 0–5)
LYMPHOCYTES # BLD: 3 K/UL (ref 0.5–4.6)
LYMPHOCYTES NFR BLD: 34 % (ref 13–44)
MCH RBC QN AUTO: 30.4 PG (ref 26.1–32.9)
MCHC RBC AUTO-ENTMCNC: 32.9 G/DL (ref 31.4–35)
MCV RBC AUTO: 92.2 FL (ref 79.6–97.8)
MONOCYTES # BLD: 0.6 K/UL (ref 0.1–1.3)
MONOCYTES NFR BLD: 7 % (ref 4–12)
NEUTS SEG # BLD: 4.6 K/UL (ref 1.7–8.2)
NEUTS SEG NFR BLD: 53 % (ref 43–78)
NRBC # BLD: 0 K/UL (ref 0–0.2)
PLATELET # BLD AUTO: 240 K/UL (ref 150–450)
PMV BLD AUTO: 9.8 FL (ref 9.4–12.3)
POTASSIUM SERPL-SCNC: 5.8 MMOL/L (ref 3.5–5.1)
PROT SERPL-MCNC: 7.3 G/DL (ref 6.3–8.2)
RBC # BLD AUTO: 4.64 M/UL (ref 4.05–5.2)
SODIUM SERPL-SCNC: 134 MMOL/L (ref 136–145)
WBC # BLD AUTO: 8.7 K/UL (ref 4.3–11.1)

## 2018-09-02 PROCEDURE — 74011250636 HC RX REV CODE- 250/636: Performed by: EMERGENCY MEDICINE

## 2018-09-02 PROCEDURE — 96361 HYDRATE IV INFUSION ADD-ON: CPT | Performed by: EMERGENCY MEDICINE

## 2018-09-02 PROCEDURE — 81003 URINALYSIS AUTO W/O SCOPE: CPT | Performed by: EMERGENCY MEDICINE

## 2018-09-02 PROCEDURE — 85025 COMPLETE CBC W/AUTO DIFF WBC: CPT

## 2018-09-02 PROCEDURE — 96375 TX/PRO/DX INJ NEW DRUG ADDON: CPT | Performed by: EMERGENCY MEDICINE

## 2018-09-02 PROCEDURE — 99284 EMERGENCY DEPT VISIT MOD MDM: CPT | Performed by: EMERGENCY MEDICINE

## 2018-09-02 PROCEDURE — 70450 CT HEAD/BRAIN W/O DYE: CPT

## 2018-09-02 PROCEDURE — 96374 THER/PROPH/DIAG INJ IV PUSH: CPT | Performed by: EMERGENCY MEDICINE

## 2018-09-02 PROCEDURE — 80053 COMPREHEN METABOLIC PANEL: CPT

## 2018-09-02 RX ORDER — PROCHLORPERAZINE MALEATE 10 MG
10 TABLET ORAL
Qty: 12 TAB | Refills: 0 | Status: SHIPPED | OUTPATIENT
Start: 2018-09-02 | End: 2018-09-09

## 2018-09-02 RX ORDER — METOCLOPRAMIDE HYDROCHLORIDE 5 MG/ML
10 INJECTION INTRAMUSCULAR; INTRAVENOUS
Status: COMPLETED | OUTPATIENT
Start: 2018-09-02 | End: 2018-09-02

## 2018-09-02 RX ORDER — DIPHENHYDRAMINE HYDROCHLORIDE 50 MG/ML
12.5 INJECTION, SOLUTION INTRAMUSCULAR; INTRAVENOUS
Status: COMPLETED | OUTPATIENT
Start: 2018-09-02 | End: 2018-09-02

## 2018-09-02 RX ORDER — KETOROLAC TROMETHAMINE 30 MG/ML
30 INJECTION, SOLUTION INTRAMUSCULAR; INTRAVENOUS
Status: COMPLETED | OUTPATIENT
Start: 2018-09-02 | End: 2018-09-02

## 2018-09-02 RX ADMIN — SODIUM CHLORIDE 1000 ML: 900 INJECTION, SOLUTION INTRAVENOUS at 19:03

## 2018-09-02 RX ADMIN — DIPHENHYDRAMINE HYDROCHLORIDE 12.5 MG: 50 INJECTION, SOLUTION INTRAMUSCULAR; INTRAVENOUS at 19:04

## 2018-09-02 RX ADMIN — METOCLOPRAMIDE 10 MG: 5 INJECTION, SOLUTION INTRAMUSCULAR; INTRAVENOUS at 19:06

## 2018-09-02 RX ADMIN — KETOROLAC TROMETHAMINE 30 MG: 30 INJECTION, SOLUTION INTRAMUSCULAR at 19:05
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
## 2018-09-02 NOTE — ED NOTES
Talked with Dr. Isamar Schilling at this time, no orders received with patient blood pressure within normal limits.

## 2018-09-02 NOTE — LETTER
[address] [facility] EMERGENCY DEPT 
[facility] [address] [place] [id number] 
[phone] Work/School Note Date: 9/2/2018 To Whom It May concern: 
 
Christoph Rolon was seen and treated today in the emergency room by the following provider(s): 
Attending Provider: Jacinta Pérez MD.   
 
Christoph Rolon may return to work on 09/04/18.  
 
Sincerely, 
 
 
 
 
Murali Dow RN

## 2018-09-02 NOTE — ED TRIAGE NOTES
Patient complaining of headache x 4 days, with episodes of feeling dizzy. Patient advises being seen at urgent care yesterday and given Fioricet and ibuprofen. Patient denies any further complaints at this time.

## 2018-09-03 NOTE — ED NOTES
I have reviewed discharge instructions with the patient. The patient verbalized understanding. Patient left ED via Discharge Method: ambulatory to Home with spouse Opportunity for questions and clarification provided. Patient given 1 scripts. To continue your aftercare when you leave the hospital, you may receive an automated call from our care team to check in on how you are doing. This is a free service and part of our promise to provide the best care and service to meet your aftercare needs.  If you have questions, or wish to unsubscribe from this service please call [phone]. Thank you for Choosing our [facility] Emergency Department.

## 2018-09-03 NOTE — DISCHARGE INSTRUCTIONS
Migraine Headache: Care Instructions  Your Care Instructions  Migraines are painful, throbbing headaches that often start on one side of the head. They may cause nausea and vomiting and make you sensitive to light, sound, or smell. Without treatment, migraines can last from 4 hours to a few days. Medicines can help prevent migraines or stop them after they have started. Your doctor can help you find which ones work best for you. Follow-up care is a key part of your treatment and safety. Be sure to make and go to all appointments, and call your doctor if you are having problems. It's also a good idea to know your test results and keep a list of the medicines you take. How can you care for yourself at home? · Do not drive if you have taken a prescription pain medicine. · Rest in a quiet, dark room until your headache is gone. Close your eyes, and try to relax or go to sleep. Don't watch TV or read. · Put a cold, moist cloth or cold pack on the painful area for 10 to 20 minutes at a time. Put a thin cloth between the cold pack and your skin. · Use a warm, moist towel or a heating pad set on low to relax tight shoulder and neck muscles. · Have someone gently massage your neck and shoulders. · Take your medicines exactly as prescribed. Call your doctor if you think you are having a problem with your medicine. You will get more details on the specific medicines your doctor prescribes. · Be careful not to take pain medicine more often than the instructions allow. You could get worse or more frequent headaches when the medicine wears off. To prevent migraines  · Keep a headache diary so you can figure out what triggers your headaches. Avoiding triggers may help you prevent headaches. Record when each headache began, how long it lasted, and what the pain was like.  (Was it throbbing, aching, stabbing, or dull?) Write down any other symptoms you had with the headache, such as nausea, flashing lights or dark spots, or sensitivity to bright light or loud noise. Note if the headache occurred near your period. List anything that might have triggered the headache. Triggers may include certain foods (chocolate, cheese, wine) or odors, smoke, bright light, stress, or lack of sleep. · If your doctor has prescribed medicine for your migraines, take it as directed. You may have medicine that you take only when you get a migraine and medicine that you take all the time to help prevent migraines. ¨ If your doctor has prescribed medicine for when you get a headache, take it at the first sign of a migraine, unless your doctor has given you other instructions. ¨ If your doctor has prescribed medicine to prevent migraines, take it exactly as prescribed. Call your doctor if you think you are having a problem with your medicine. · Find healthy ways to deal with stress. Migraines are most common during or right after stressful times. Take time to relax before and after you do something that has caused a migraine in the past.  · Try to keep your muscles relaxed by keeping good posture. Check your jaw, face, neck, and shoulder muscles for tension. Try to relax them. When you sit at a desk, change positions often. And make sure to stretch for 30 seconds each hour. · Get plenty of sleep and exercise. · Eat meals on a regular schedule. Avoid foods and drinks that often trigger migraines. These include chocolate, alcohol (especially red wine and port), aspartame, monosodium glutamate (MSG), and some additives found in foods (such as hot dogs, swain, cold cuts, aged cheeses, and pickled foods). · Limit caffeine. Don't drink too much coffee, tea, or soda. But don't quit caffeine suddenly. That can also give you migraines. · Do not smoke or allow others to smoke around you. If you need help quitting, talk to your doctor about stop-smoking programs and medicines. These can increase your chances of quitting for good.   · If you are taking birth control pills or hormone therapy, talk to your doctor about whether they are triggering your migraines. When should you call for help? Call 911 anytime you think you may need emergency care. For example, call if:    · You have signs of a stroke. These may include:  ¨ Sudden numbness, paralysis, or weakness in your face, arm, or leg, especially on only one side of your body. ¨ Sudden vision changes. ¨ Sudden trouble speaking. ¨ Sudden confusion or trouble understanding simple statements. ¨ Sudden problems with walking or balance. ¨ A sudden, severe headache that is different from past headaches.    Call your doctor now or seek immediate medical care if:    · You have new or worse nausea and vomiting.     · You have a new or higher fever.     · Your headache gets much worse.    Watch closely for changes in your health, and be sure to contact your doctor if:    · You are not getting better after 2 days (48 hours). Where can you learn more? Go to http://narcisa-prince.info/. Enter K530 in the search box to learn more about \"Migraine Headache: Care Instructions. \"  Current as of: October 9, 2017  Content Version: 11.7  © 0531-0367 [employer]. Care instructions adapted under license by [employer] (which disclaims liability or warranty for this information). If you have questions about a medical condition or this instruction, always ask your healthcare professional. [address] any warranty or liability for your use of this information.

## 2018-09-21 ENCOUNTER — HOSPITAL ENCOUNTER (EMERGENCY)
Age: 58
Discharge: HOME | End: 2018-09-21
Attending: EMERGENCY MEDICINE
Payer: COMMERCIAL

## 2018-09-21 VITALS
OXYGEN SATURATION: 96 % | TEMPERATURE: 97.5 F | RESPIRATION RATE: 19 BRPM | DIASTOLIC BLOOD PRESSURE: 60 MMHG | BODY MASS INDEX: 28.93 KG/M2 | WEIGHT: 180 LBS | HEART RATE: 83 BPM | HEIGHT: 66 IN | SYSTOLIC BLOOD PRESSURE: 127 MMHG

## 2018-09-21 DIAGNOSIS — G43.901 STATUS MIGRAINOSUS: Primary | ICD-10-CM

## 2018-09-21 PROCEDURE — 74011250636 HC RX REV CODE- 250/636: Performed by: EMERGENCY MEDICINE

## 2018-09-21 PROCEDURE — 99283 EMERGENCY DEPT VISIT LOW MDM: CPT | Performed by: EMERGENCY MEDICINE

## 2018-09-21 PROCEDURE — 96375 TX/PRO/DX INJ NEW DRUG ADDON: CPT | Performed by: EMERGENCY MEDICINE

## 2018-09-21 PROCEDURE — 74011000250 HC RX REV CODE- 250: Performed by: EMERGENCY MEDICINE

## 2018-09-21 PROCEDURE — 74011000258 HC RX REV CODE- 258: Performed by: EMERGENCY MEDICINE

## 2018-09-21 PROCEDURE — 96365 THER/PROPH/DIAG IV INF INIT: CPT | Performed by: EMERGENCY MEDICINE

## 2018-09-21 RX ORDER — KETOROLAC TROMETHAMINE 30 MG/ML
30 INJECTION, SOLUTION INTRAMUSCULAR; INTRAVENOUS
Status: COMPLETED | OUTPATIENT
Start: 2018-09-21 | End: 2018-09-21

## 2018-09-21 RX ORDER — BUTALBITAL, ACETAMINOPHEN, CAFFEINE AND CODEINE PHOSPHATE 50; 325; 40; 30 MG/1; MG/1; MG/1; MG/1
1 CAPSULE ORAL
COMMUNITY
End: 2018-09-21

## 2018-09-21 RX ORDER — IBUPROFEN 800 MG/1
800 TABLET ORAL
COMMUNITY
End: 2018-10-11

## 2018-09-21 RX ORDER — METOPROLOL TARTRATE 25 MG/1
25 TABLET, FILM COATED ORAL 2 TIMES DAILY
COMMUNITY

## 2018-09-21 RX ORDER — TRAZODONE HYDROCHLORIDE 50 MG/1
50 TABLET ORAL
COMMUNITY

## 2018-09-21 RX ORDER — DEXAMETHASONE SODIUM PHOSPHATE 100 MG/10ML
10 INJECTION INTRAMUSCULAR; INTRAVENOUS
Status: COMPLETED | OUTPATIENT
Start: 2018-09-21 | End: 2018-09-21

## 2018-09-21 RX ORDER — LANOLIN ALCOHOL/MO/W.PET/CERES
5 CREAM (GRAM) TOPICAL
COMMUNITY

## 2018-09-21 RX ORDER — ROSUVASTATIN CALCIUM 20 MG/1
20 TABLET, COATED ORAL
COMMUNITY

## 2018-09-21 RX ORDER — BUTALBITAL, ACETAMINOPHEN AND CAFFEINE 300; 40; 50 MG/1; MG/1; MG/1
1 CAPSULE ORAL
Qty: 10 CAP | Refills: 0 | Status: SHIPPED | OUTPATIENT
Start: 2018-09-21 | End: 2018-09-26

## 2018-09-21 RX ORDER — MELOXICAM 7.5 MG/1
7.5 TABLET ORAL DAILY
COMMUNITY

## 2018-09-21 RX ORDER — ASPIRIN 325 MG
325 TABLET ORAL DAILY
COMMUNITY

## 2018-09-21 RX ORDER — RAMIPRIL 2.5 MG/1
2.5 CAPSULE ORAL DAILY
COMMUNITY

## 2018-09-21 RX ORDER — LAMOTRIGINE 50 MG/1
50 TABLET, EXTENDED RELEASE ORAL DAILY
COMMUNITY

## 2018-09-21 RX ORDER — ONDANSETRON 2 MG/ML
4 INJECTION INTRAMUSCULAR; INTRAVENOUS
Status: COMPLETED | OUTPATIENT
Start: 2018-09-21 | End: 2018-09-21

## 2018-09-21 RX ORDER — NALBUPHINE HYDROCHLORIDE 10 MG/ML
10 INJECTION, SOLUTION INTRAMUSCULAR; INTRAVENOUS; SUBCUTANEOUS
Status: COMPLETED | OUTPATIENT
Start: 2018-09-21 | End: 2018-09-21

## 2018-09-21 RX ORDER — HYDROXYZINE PAMOATE 25 MG/1
25 CAPSULE ORAL
COMMUNITY

## 2018-09-21 RX ADMIN — SODIUM CHLORIDE 1000 ML: 900 INJECTION, SOLUTION INTRAVENOUS at 15:55

## 2018-09-21 RX ADMIN — ONDANSETRON 4 MG: 2 INJECTION INTRAMUSCULAR; INTRAVENOUS at 17:25

## 2018-09-21 RX ADMIN — NALBUPHINE HYDROCHLORIDE 10 MG: 10 INJECTION, SOLUTION INTRAMUSCULAR; INTRAVENOUS; SUBCUTANEOUS at 17:26

## 2018-09-21 RX ADMIN — DEXAMETHASONE SODIUM PHOSPHATE 10 MG: 10 INJECTION INTRAMUSCULAR; INTRAVENOUS at 15:58

## 2018-09-21 RX ADMIN — KETOROLAC TROMETHAMINE 30 MG: 30 INJECTION, SOLUTION INTRAMUSCULAR at 15:55

## 2018-09-21 RX ADMIN — VALPROATE SODIUM 750 MG: 100 INJECTION, SOLUTION INTRAVENOUS at 16:20

## 2018-09-21 NOTE — ED NOTES
Headache improved. Vital signs stable. Patient well-appearing. Patient ready for discharge home w/ close follow-up outpatient.

## 2018-09-21 NOTE — ED NOTES
I have reviewed discharge instructions with the patient. The patient verbalized understanding. Patient left ED via Discharge Method: ambulatory to Home with ). Opportunity for questions and clarification provided. Patient given 1 scripts.  driving her home. To continue your aftercare when you leave the hospital, you may receive an automated call from our care team to check in on how you are doing. This is a free service and part of our promise to provide the best care and service to meet your aftercare needs.  If you have questions, or wish to unsubscribe from this service please call [phone]. Thank you for Choosing our [facility] Emergency Department.
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
## 2018-09-21 NOTE — ED TRIAGE NOTES
Pt in c/o migraine headache x 3 weeks. States she has been seen by pcp multiple times. States seen at ed and by neurologist. States neurologist increased cymbalta and ordered outpatient MRI. Pt has not had MRI yet.

## 2018-09-21 NOTE — DISCHARGE INSTRUCTIONS
Migraine Headache: Care Instructions  Your Care Instructions  Migraines are painful, throbbing headaches that often start on one side of the head. They may cause nausea and vomiting and make you sensitive to light, sound, or smell. Without treatment, migraines can last from 4 hours to a few days. Medicines can help prevent migraines or stop them after they have started. Your doctor can help you find which ones work best for you. Follow-up care is a key part of your treatment and safety. Be sure to make and go to all appointments, and call your doctor if you are having problems. It's also a good idea to know your test results and keep a list of the medicines you take. How can you care for yourself at home? · Do not drive if you have taken a prescription pain medicine. · Rest in a quiet, dark room until your headache is gone. Close your eyes, and try to relax or go to sleep. Don't watch TV or read. · Put a cold, moist cloth or cold pack on the painful area for 10 to 20 minutes at a time. Put a thin cloth between the cold pack and your skin. · Use a warm, moist towel or a heating pad set on low to relax tight shoulder and neck muscles. · Have someone gently massage your neck and shoulders. · Take your medicines exactly as prescribed. Call your doctor if you think you are having a problem with your medicine. You will get more details on the specific medicines your doctor prescribes. · Be careful not to take pain medicine more often than the instructions allow. You could get worse or more frequent headaches when the medicine wears off. To prevent migraines  · Keep a headache diary so you can figure out what triggers your headaches. Avoiding triggers may help you prevent headaches. Record when each headache began, how long it lasted, and what the pain was like.  (Was it throbbing, aching, stabbing, or dull?) Write down any other symptoms you had with the headache, such as nausea, flashing lights or dark spots, or sensitivity to bright light or loud noise. Note if the headache occurred near your period. List anything that might have triggered the headache. Triggers may include certain foods (chocolate, cheese, wine) or odors, smoke, bright light, stress, or lack of sleep. · If your doctor has prescribed medicine for your migraines, take it as directed. You may have medicine that you take only when you get a migraine and medicine that you take all the time to help prevent migraines. ¨ If your doctor has prescribed medicine for when you get a headache, take it at the first sign of a migraine, unless your doctor has given you other instructions. ¨ If your doctor has prescribed medicine to prevent migraines, take it exactly as prescribed. Call your doctor if you think you are having a problem with your medicine. · Find healthy ways to deal with stress. Migraines are most common during or right after stressful times. Take time to relax before and after you do something that has caused a migraine in the past.  · Try to keep your muscles relaxed by keeping good posture. Check your jaw, face, neck, and shoulder muscles for tension. Try to relax them. When you sit at a desk, change positions often. And make sure to stretch for 30 seconds each hour. · Get plenty of sleep and exercise. · Eat meals on a regular schedule. Avoid foods and drinks that often trigger migraines. These include chocolate, alcohol (especially red wine and port), aspartame, monosodium glutamate (MSG), and some additives found in foods (such as hot dogs, swain, cold cuts, aged cheeses, and pickled foods). · Limit caffeine. Don't drink too much coffee, tea, or soda. But don't quit caffeine suddenly. That can also give you migraines. · Do not smoke or allow others to smoke around you. If you need help quitting, talk to your doctor about stop-smoking programs and medicines. These can increase your chances of quitting for good.   · If you are taking birth control pills or hormone therapy, talk to your doctor about whether they are triggering your migraines. When should you call for help? Call 911 anytime you think you may need emergency care. For example, call if:    · You have signs of a stroke. These may include:  ¨ Sudden numbness, paralysis, or weakness in your face, arm, or leg, especially on only one side of your body. ¨ Sudden vision changes. ¨ Sudden trouble speaking. ¨ Sudden confusion or trouble understanding simple statements. ¨ Sudden problems with walking or balance. ¨ A sudden, severe headache that is different from past headaches.    Call your doctor now or seek immediate medical care if:    · You have new or worse nausea and vomiting.     · You have a new or higher fever.     · Your headache gets much worse.    Watch closely for changes in your health, and be sure to contact your doctor if:    · You are not getting better after 2 days (48 hours). Where can you learn more? Go to http://narcisa-prince.info/. Enter O827 in the search box to learn more about \"Migraine Headache: Care Instructions. \"  Current as of: October 9, 2017  Content Version: 11.7  © 2553-9402 [employer]. Care instructions adapted under license by [employer] (which disclaims liability or warranty for this information). If you have questions about a medical condition or this instruction, always ask your healthcare professional. [address] any warranty or liability for your use of this information.

## 2018-10-09 ENCOUNTER — APPOINTMENT (OUTPATIENT)
Dept: GENERAL RADIOLOGY | Age: 58
DRG: 103 | End: 2018-10-09
Attending: INTERNAL MEDICINE
Payer: COMMERCIAL

## 2018-10-09 ENCOUNTER — HOSPITAL ENCOUNTER (OUTPATIENT)
Age: 58
Setting detail: OBSERVATION
LOS: 1 days | Discharge: HOME | DRG: 103 | End: 2018-10-11
Attending: INTERNAL MEDICINE | Admitting: INTERNAL MEDICINE
Payer: COMMERCIAL

## 2018-10-09 DIAGNOSIS — G43.511 INTRACTABLE PERSISTENT MIGRAINE AURA WITHOUT CEREBRAL INFARCTION AND WITH STATUS MIGRAINOSUS: ICD-10-CM

## 2018-10-09 DIAGNOSIS — F32.1 CURRENT MODERATE EPISODE OF MAJOR DEPRESSIVE DISORDER, UNSPECIFIED WHETHER RECURRENT (HCC): Primary | ICD-10-CM

## 2018-10-09 PROBLEM — G43.919 INTRACTABLE MIGRAINE: Status: ACTIVE | Noted: 2018-10-09

## 2018-10-09 LAB
ALBUMIN SERPL-MCNC: 4.1 G/DL (ref 3.5–5)
ALBUMIN/GLOB SERPL: 1.4 {RATIO} (ref 1.2–3.5)
ALP SERPL-CCNC: 52 U/L (ref 50–136)
ALT SERPL-CCNC: 28 U/L (ref 12–65)
ANION GAP SERPL CALC-SCNC: 5 MMOL/L (ref 7–16)
AST SERPL-CCNC: 17 U/L (ref 15–37)
BASOPHILS # BLD: 0.1 K/UL (ref 0–0.2)
BASOPHILS NFR BLD: 1 % (ref 0–2)
BILIRUB SERPL-MCNC: 0.3 MG/DL (ref 0.2–1.1)
BUN SERPL-MCNC: 12 MG/DL (ref 6–23)
CALCIUM SERPL-MCNC: 8.8 MG/DL (ref 8.3–10.4)
CHLORIDE SERPL-SCNC: 111 MMOL/L (ref 98–107)
CHOLEST SERPL-MCNC: 140 MG/DL
CO2 SERPL-SCNC: 23 MMOL/L (ref 21–32)
CREAT SERPL-MCNC: 1.26 MG/DL (ref 0.6–1)
DIFFERENTIAL METHOD BLD: NORMAL
EOSINOPHIL # BLD: 0.4 K/UL (ref 0–0.8)
EOSINOPHIL NFR BLD: 5 % (ref 0.5–7.8)
ERYTHROCYTE [DISTWIDTH] IN BLOOD BY AUTOMATED COUNT: 13.7 %
GLOBULIN SER CALC-MCNC: 3 G/DL (ref 2.3–3.5)
GLUCOSE SERPL-MCNC: 80 MG/DL (ref 65–100)
HCT VFR BLD AUTO: 43.6 % (ref 35.8–46.3)
HDLC SERPL-MCNC: 49 MG/DL (ref 40–60)
HDLC SERPL: 2.9 {RATIO}
HGB BLD-MCNC: 14.6 G/DL (ref 11.7–15.4)
IMM GRANULOCYTES # BLD: 0 K/UL (ref 0–0.5)
IMM GRANULOCYTES NFR BLD AUTO: 0 % (ref 0–5)
LDLC SERPL CALC-MCNC: 67.2 MG/DL
LIPID PROFILE,FLP: ABNORMAL
LYMPHOCYTES # BLD: 2.8 K/UL (ref 0.5–4.6)
LYMPHOCYTES NFR BLD: 33 % (ref 13–44)
MCH RBC QN AUTO: 30.9 PG (ref 26.1–32.9)
MCHC RBC AUTO-ENTMCNC: 33.5 G/DL (ref 31.4–35)
MCV RBC AUTO: 92.2 FL (ref 79.6–97.8)
MONOCYTES # BLD: 0.4 K/UL (ref 0.1–1.3)
MONOCYTES NFR BLD: 5 % (ref 4–12)
NEUTS SEG # BLD: 4.7 K/UL (ref 1.7–8.2)
NEUTS SEG NFR BLD: 56 % (ref 43–78)
NRBC # BLD: 0 K/UL (ref 0–0.2)
PLATELET # BLD AUTO: 253 K/UL (ref 150–450)
PMV BLD AUTO: 9.9 FL (ref 9.4–12.3)
POTASSIUM SERPL-SCNC: 4.4 MMOL/L (ref 3.5–5.1)
PROT SERPL-MCNC: 7.1 G/DL (ref 6.3–8.2)
RBC # BLD AUTO: 4.73 M/UL (ref 4.05–5.2)
SODIUM SERPL-SCNC: 139 MMOL/L (ref 136–145)
TRIGL SERPL-MCNC: 119 MG/DL (ref 35–150)
TSH SERPL DL<=0.005 MIU/L-ACNC: 1.66 UIU/ML (ref 0.36–3.74)
VLDLC SERPL CALC-MCNC: 23.8 MG/DL (ref 6–23)
WBC # BLD AUTO: 8.4 K/UL (ref 4.3–11.1)

## 2018-10-09 PROCEDURE — 84443 ASSAY THYROID STIM HORMONE: CPT

## 2018-10-09 PROCEDURE — 74011000250 HC RX REV CODE- 250: Performed by: INTERNAL MEDICINE

## 2018-10-09 PROCEDURE — 93005 ELECTROCARDIOGRAM TRACING: CPT | Performed by: INTERNAL MEDICINE

## 2018-10-09 PROCEDURE — 74011250636 HC RX REV CODE- 250/636: Performed by: INTERNAL MEDICINE

## 2018-10-09 PROCEDURE — 80061 LIPID PANEL: CPT

## 2018-10-09 PROCEDURE — 36415 COLL VENOUS BLD VENIPUNCTURE: CPT

## 2018-10-09 PROCEDURE — 85025 COMPLETE CBC W/AUTO DIFF WBC: CPT

## 2018-10-09 PROCEDURE — 65270000029 HC RM PRIVATE

## 2018-10-09 PROCEDURE — 71045 X-RAY EXAM CHEST 1 VIEW: CPT

## 2018-10-09 PROCEDURE — 74011250637 HC RX REV CODE- 250/637: Performed by: INTERNAL MEDICINE

## 2018-10-09 PROCEDURE — 77030020263 HC SOL INJ SOD CL0.9% LFCR 1000ML

## 2018-10-09 PROCEDURE — 99218 HC RM OBSERVATION: CPT

## 2018-10-09 PROCEDURE — 80053 COMPREHEN METABOLIC PANEL: CPT

## 2018-10-09 PROCEDURE — 77030012893

## 2018-10-09 RX ORDER — TRAZODONE HYDROCHLORIDE 50 MG/1
50 TABLET ORAL
Status: DISCONTINUED | OUTPATIENT
Start: 2018-10-09 | End: 2018-10-10

## 2018-10-09 RX ORDER — HEPARIN SODIUM 5000 [USP'U]/ML
5000 INJECTION, SOLUTION INTRAVENOUS; SUBCUTANEOUS EVERY 12 HOURS
Status: DISCONTINUED | OUTPATIENT
Start: 2018-10-09 | End: 2018-10-11 | Stop reason: HOSPADM

## 2018-10-09 RX ORDER — METOCLOPRAMIDE HYDROCHLORIDE 5 MG/ML
10 INJECTION INTRAMUSCULAR; INTRAVENOUS
Status: DISCONTINUED | OUTPATIENT
Start: 2018-10-09 | End: 2018-10-09

## 2018-10-09 RX ORDER — DULOXETIN HYDROCHLORIDE 60 MG/1
60 CAPSULE, DELAYED RELEASE ORAL DAILY
Status: DISCONTINUED | OUTPATIENT
Start: 2018-10-10 | End: 2018-10-09

## 2018-10-09 RX ORDER — ACETAMINOPHEN 325 MG/1
650 TABLET ORAL
Status: DISCONTINUED | OUTPATIENT
Start: 2018-10-09 | End: 2018-10-11 | Stop reason: HOSPADM

## 2018-10-09 RX ORDER — RAMIPRIL 2.5 MG/1
2.5 CAPSULE ORAL DAILY
Status: DISCONTINUED | OUTPATIENT
Start: 2018-10-10 | End: 2018-10-11 | Stop reason: HOSPADM

## 2018-10-09 RX ORDER — DIPHENHYDRAMINE HCL 25 MG
25 CAPSULE ORAL
Status: DISCONTINUED | OUTPATIENT
Start: 2018-10-09 | End: 2018-10-10

## 2018-10-09 RX ORDER — METOPROLOL TARTRATE 25 MG/1
25 TABLET, FILM COATED ORAL 2 TIMES DAILY
Status: DISCONTINUED | OUTPATIENT
Start: 2018-10-09 | End: 2018-10-11 | Stop reason: HOSPADM

## 2018-10-09 RX ORDER — METOCLOPRAMIDE HYDROCHLORIDE 5 MG/ML
10 INJECTION INTRAMUSCULAR; INTRAVENOUS EVERY 6 HOURS
Status: DISCONTINUED | OUTPATIENT
Start: 2018-10-09 | End: 2018-10-11 | Stop reason: HOSPADM

## 2018-10-09 RX ORDER — TOPIRAMATE 100 MG/1
100 TABLET, FILM COATED ORAL 2 TIMES DAILY WITH MEALS
Status: DISCONTINUED | OUTPATIENT
Start: 2018-10-09 | End: 2018-10-10

## 2018-10-09 RX ORDER — DIAPER,BRIEF,INFANT-TODD,DISP
1 EACH MISCELLANEOUS DAILY
COMMUNITY

## 2018-10-09 RX ORDER — MELATONIN
1000 DAILY
COMMUNITY

## 2018-10-09 RX ORDER — LAMOTRIGINE 100 MG/1
100 TABLET ORAL
Status: DISCONTINUED | OUTPATIENT
Start: 2018-10-09 | End: 2018-10-11 | Stop reason: HOSPADM

## 2018-10-09 RX ORDER — DULOXETIN HYDROCHLORIDE 60 MG/1
60 CAPSULE, DELAYED RELEASE ORAL 2 TIMES DAILY
Status: DISCONTINUED | OUTPATIENT
Start: 2018-10-09 | End: 2018-10-10

## 2018-10-09 RX ORDER — SODIUM CHLORIDE 9 MG/ML
75 INJECTION, SOLUTION INTRAVENOUS CONTINUOUS
Status: DISPENSED | OUTPATIENT
Start: 2018-10-09 | End: 2018-10-10

## 2018-10-09 RX ORDER — LAMOTRIGINE 25 MG/1
50 TABLET ORAL DAILY
Status: DISCONTINUED | OUTPATIENT
Start: 2018-10-10 | End: 2018-10-11 | Stop reason: HOSPADM

## 2018-10-09 RX ORDER — SUMATRIPTAN 50 MG/1
50 TABLET, FILM COATED ORAL
Status: DISCONTINUED | OUTPATIENT
Start: 2018-10-09 | End: 2018-10-09

## 2018-10-09 RX ORDER — TOPIRAMATE 100 MG/1
100 TABLET, FILM COATED ORAL 2 TIMES DAILY
COMMUNITY

## 2018-10-09 RX ORDER — IBUPROFEN 400 MG/1
800 TABLET ORAL 3 TIMES DAILY
Status: DISCONTINUED | OUTPATIENT
Start: 2018-10-09 | End: 2018-10-11

## 2018-10-09 RX ORDER — ONDANSETRON 2 MG/ML
4 INJECTION INTRAMUSCULAR; INTRAVENOUS
Status: DISCONTINUED | OUTPATIENT
Start: 2018-10-09 | End: 2018-10-09

## 2018-10-09 RX ORDER — LORATADINE 10 MG/1
10 TABLET ORAL
Status: DISCONTINUED | OUTPATIENT
Start: 2018-10-09 | End: 2018-10-09

## 2018-10-09 RX ORDER — ROSUVASTATIN CALCIUM 20 MG/1
20 TABLET, COATED ORAL
Status: DISCONTINUED | OUTPATIENT
Start: 2018-10-09 | End: 2018-10-11 | Stop reason: HOSPADM

## 2018-10-09 RX ADMIN — ROSUVASTATIN CALCIUM 20 MG: 20 TABLET, FILM COATED ORAL at 21:14

## 2018-10-09 RX ADMIN — FAMOTIDINE 20 MG: 10 INJECTION, SOLUTION INTRAVENOUS at 21:14

## 2018-10-09 RX ADMIN — METOPROLOL TARTRATE 25 MG: 25 TABLET ORAL at 18:15

## 2018-10-09 RX ADMIN — IBUPROFEN 800 MG: 400 TABLET ORAL at 21:14

## 2018-10-09 RX ADMIN — SODIUM CHLORIDE 75 ML/HR: 900 INJECTION, SOLUTION INTRAVENOUS at 18:18

## 2018-10-09 RX ADMIN — METOCLOPRAMIDE 10 MG: 5 INJECTION, SOLUTION INTRAMUSCULAR; INTRAVENOUS at 18:11

## 2018-10-09 RX ADMIN — DULOXETINE HYDROCHLORIDE 60 MG: 60 CAPSULE, DELAYED RELEASE ORAL at 18:17

## 2018-10-09 RX ADMIN — TOPIRAMATE 100 MG: 100 TABLET, FILM COATED ORAL at 18:17

## 2018-10-09 RX ADMIN — METOCLOPRAMIDE 10 MG: 5 INJECTION, SOLUTION INTRAMUSCULAR; INTRAVENOUS at 23:30

## 2018-10-09 RX ADMIN — LAMOTRIGINE 100 MG: 100 TABLET ORAL at 21:14

## 2018-10-09 RX ADMIN — IBUPROFEN 800 MG: 400 TABLET ORAL at 18:15

## 2018-10-09 RX ADMIN — HEPARIN SODIUM 5000 UNITS: 5000 INJECTION INTRAVENOUS; SUBCUTANEOUS at 21:14

## 2018-10-09 RX ADMIN — TRAZODONE HYDROCHLORIDE 50 MG: 50 TABLET ORAL at 21:15

## 2018-10-09 NOTE — PROGRESS NOTES
made initial visit. Pt was alert and verbal.  Staff was working with pt. Pt appeared to be comfortable with no pain level expressed or observed.  welcomed pt to [facility] and shared information about Spiritual Care.  provided spiritual care through presence, pastoral conversation, and assurance of prayer.

## 2018-10-09 NOTE — PROGRESS NOTES
10/09/18 1913 Dual Skin Pressure Injury Assessment Dual Skin Pressure Injury Assessment WDL Scattered tattoos to legs. No skin abnormalities noted.

## 2018-10-09 NOTE — H&P
History and Physical 
 
Patient: Agnes Weaver MRN: [id number]  SSN: [id number] YOB: 1960  Age: 62 y.o. Sex: female Subjective:  
Cc: \" I have migraine\" Agnes Weaver is a 62 y.o. female who has a PMH of migraine headaches, chronic tachycardia, depression, dyslipidemia, insomnia who was referred by an outside neurologist ( Dr. Esther Fabian ) in view if intractable migraine headaches. She stated her migraine has been present for around 6 weeks, it is 5/10 now and is predominantly over her sagittal and occipital area, it is associated with nausea, denies aura, blurry vision, vomiting, weakness. She does have mild hand tingling. The patient has used 1 week ago topamax, ibuprofen, tylenol, imitrex with no improvement. Her neurologist today mentioned her he will be starting her on an iv medication for 3 days ( DHE ). The patient came with no paper chart/resume/management plan. I tried to contact his office at [phone] and also his personal phone # at [phone] with no answer. On my assessment, then patient was in mild distress due to her headache. She provided all the history. Dr. Salima Pittman was contacted over the phone and he will evaluate patient tomorrow am.  
 
ROS: as above PMH: as above Social hx: chronic heavy smoker. 1 ppd > 30 years. Social alcohol intake. No drugs Family hx: her father had CHF and her mother had COPD Past Medical History:  
Diagnosis Date  Allergic rhinitis, cause unspecified  Chronic pain   
 right knee for years  Depression   
 with bi-polar disorder , stable at present  Generalized anxiety disorder  Right knee pain  Sleep apnea, obstructive   
  patient does not use c-pap  Tobacco use disorder She tried Chantix and it caused her to have nausea, chest tightness and heartburn.  Torn meniscus   
 right Past Surgical History:  
Procedure Laterality Date  HX APPENDECTOMY    
 with hysterectomy in 1988  HX HEENT  1965  
 tonsils [address] 66 and hystertectomy Family History Problem Relation Age of Onset  COPD Mother She was a smoker.  Lung Disease Mother  Heart Disease Father CHF  Osteoporosis Father  Osteoporosis Paternal Grandmother  Arthritis-rheumatoid Brother  Asthma Sister  Osteoporosis Sister Social History Substance Use Topics  Smoking status: Former Smoker Packs/day: 1.00 Quit date: 8/28/2016  Smokeless tobacco: Never Used Comment: 9/9/14 She is down to 1/2 ppd-she is vaping  Alcohol use Yes Comment: 2 per month Prior to Admission medications Medication Sig Start Date End Date Taking? Authorizing Provider  
aspirin (ASPIRIN) 325 mg tablet Take 325 mg by mouth daily. Taras Caballero MD  
metoprolol tartrate (LOPRESSOR) 25 mg tablet Take 25 mg by mouth two (2) times a day. Taras Caballero MD  
lamoTRIgine (LAMICTAL XR) 50 mg tr24 ER tablet Take 50 mg by mouth daily. Taras Caballero MD  
meloxicam (MOBIC) 7.5 mg tablet Take 7.5 mg by mouth daily. Taras Caballero MD  
rosuvastatin (CRESTOR) 20 mg tablet Take 20 mg by mouth nightly. Taras Caballero MD  
traZODone (DESYREL) 50 mg tablet Take 50 mg by mouth nightly. Taras Caballero MD  
ramipril (ALTACE) 2.5 mg capsule Take 2.5 mg by mouth daily. Taras Caballero MD  
hydrOXYzine pamoate (VISTARIL) 25 mg capsule Take 25 mg by mouth two (2) times daily as needed for Itching. Taras Caballero MD  
melatonin 3 mg tablet Take 3 mg by mouth nightly. Taras Caballero MD  
ibuprofen (MOTRIN) 800 mg tablet Take 800 mg by mouth every eight (8) hours as needed for Pain. Taras Caballero MD  
lamoTRIgine (LAMICTAL) 100 mg tablet Take 100 mg by mouth nightly. Indications: BIPOLAR DISORDER IN REMISSION    Historical Provider CYCLOBENZAPRINE HCL (FLEXERIL PO) Take 10 mg by mouth as needed. Historical Provider multivitamin (ONE A DAY) tablet Take 1 Tab by mouth daily. Historical Provider  
magnesium oxide 500 mg tab Take  by mouth daily. Historical Provider  
cetirizine (ZYRTEC) 10 mg tablet Take  by mouth daily. Historical Provider DULoxetine (CYMBALTA) 60 mg capsule Take 1 Cap by mouth daily. Patient taking differently: Take 90 mg by mouth daily. Indications: ANXIETY WITH DEPRESSION 7/17/14   Barbara Childers NP Allergies Allergen Reactions  Chantix [Varenicline] Other (comments)  Codeine Hives  Dilaudid [Hydromorphone (Bulk)] Nausea and Vomiting  Medrol [Methylprednisolone] Nausea and Vomiting  Prednisone Nausea and Vomiting  Sulfa (Sulfonamide Antibiotics) Other (comments) Yeast infection Review of Systems: A comprehensive review of systems was negative except for that written in the History of Present Illness. Objective:  
 
Vitals:  
 10/09/18 1543 BP: 131/84 Pulse: 77 Resp: 18 Temp: 97.8 °F (36.6 °C) SpO2: 96% Physical Exam: 
GENERAL: alert, cooperative, no distress, appears stated age EYE: negative LYMPHATIC: Cervical, supraclavicular, and axillary nodes normal.  
THROAT & NECK: normal and no erythema or exudates noted. LUNG: clear to auscultation bilaterally HEART: regular rate and rhythm, S1, S2 normal, no murmur, click, rub or gallop ABDOMEN: soft, non-tender. Bowel sounds normal. No masses,  no organomegaly EXTREMITIES:  extremities normal, atraumatic, no cyanosis or edema SKIN: Normal. 
NEUROLOGIC: negative PSYCHIATRIC: non focal 
 
Assessment:  
 
Hospital Problems  Date Reviewed: 10/9/2018 Codes Class Noted POA * (Principal)Intractable migraine ICD-10-CM: B78.851 ICD-9-CM: 346.91  10/9/2018 Unknown Generalized anxiety disorder ICD-10-CM: F41.1 ICD-9-CM: 300.02  Unknown Yes Depression ICD-10-CM: F32.9 ICD-9-CM: 551  Unknown Yes  Sleep apnea, obstructive ICD-10-CM: G47.33 
 ICD-9-CM: 327.23  Unknown Yes Overview Signed 7/18/2014  5:42 PM by Mehul Echols She sleeps with a CPAP. Tobacco use disorder ICD-10-CM: F17.200 ICD-9-CM: 305.1  Unknown Yes Overview Signed 7/18/2014  5:43 PM by Phulinda Echols She tried Chantix and it caused her to have nausea, chest tightness and heartburn. Plan:  
1. Intractable migraine headache: she has failed to multiple outpatient management on topamax, tylenol, ibuprofen, imitrex: possibilities to consider include medication induced headache 2. Depression: no suicidal nor homicidal 
3. Chronic nicotine dependence Plan: 
Admit as observation Regular diet Hydrate with normal saline for 12 hrs Start iv reglan, ibuprofen, tylenol If akathisia is present, start benadryl Resume home meds: topamax, lamictal, cymbalta, trazodone Antiemetics Counseling given on smoking cessation. Check cbc, chem, LFTs, TSH, lipid panel, ekg, cxr Neurology evaluation tomorrow morning DVT ppx: heparin and GCS Code status: Full Estimated LOS < 2MN Risk: moderate Estimated DC planning: home Goals of care discussed with patient Signed By: Nellie Jones MD   
 October 9, 2018

## 2018-10-09 NOTE — IP AVS SNAPSHOT
[facility] 
 
 
 [address] 
[phone] Patient: Agnes Weaver MRN: [id number] :1960 About your hospitalization You were admitted on:  2018 You last received care in the:  [facility] 7 MED SURG You were discharged on:  2018 Why you were hospitalized Your primary diagnosis was: Intractable Migraine Your diagnoses also included:  Depression, Generalized Anxiety Disorder, Sleep Apnea, Obstructive, Tobacco Use Disorder Follow-up Information Follow up With Details Comments Contact Info Not On File Bshsi   Not On File (62) Patient has a PCP but that physician is not listed in [facility]. Carmencita Landon MD  Office is closed, please call tomorrow or Monday for a hospital follow-up appointment [address] [place] [id number] [phone] Discharge Orders None A check mirian indicates which time of day the medication should be taken. My Medications START taking these medications Instructions Each Dose to Equal  
 Morning Noon Evening Bedtime diphenhydrAMINE 25 mg capsule Commonly known as:  BENADRYL Your next dose is:  10/11 11:47 a.m. Take 1 Cap by mouth every six (6) hours for 2 days. 25 mg  
    
   
   
   
  
 metoclopramide HCl 5 mg tablet Commonly known as:  REGLAN Your next dose is:  TODAY, before meals Take 1 Tab by mouth Before breakfast, lunch, and dinner for 2 days. 5 mg CHANGE how you take these medications Instructions Each Dose to Equal  
 Morning Noon Evening Bedtime DULoxetine 60 mg capsule Commonly known as:  CYMBALTA What changed:  how much to take Your next dose is:  Tomorrow Morning Take 1 Cap by mouth daily. 60 mg CONTINUE taking these medications  Instructions Each Dose to Equal  
 Morning Noon Evening Bedtime ALTACE 2.5 mg capsule Generic drug:  ramipril Your next dose is:  Tomorrow Morning Take 2.5 mg by mouth daily. 2.5 mg  
    
  
   
   
   
  
 aspirin 325 mg tablet Commonly known as:  ASPIRIN Your next dose is:  Tomorrow Morning Take 325 mg by mouth daily. 325 mg  
    
  
   
   
   
  
 biotin 10,000 mcg Cap Your next dose is:  Tomorrow Morning Take 1 Tab by mouth daily. 1 Tab CRESTOR 20 mg tablet Generic drug:  rosuvastatin Your next dose is: Take tonight Take 20 mg by mouth nightly. 20 mg FLEXERIL PO Your next dose is: Take on as needed schedule Take 10 mg by mouth as needed. 10 mg * LaMICtal 100 mg tablet Generic drug:  lamoTRIgine Your next dose is: Take tonight Take 100 mg by mouth nightly. Indications: BIPOLAR DISORDER IN REMISSION  
 100 mg  
    
   
   
   
  
 * lamoTRIgine 50 mg Tr24 ER tablet Commonly known as: LaMICtal XR Your next dose is:  Resume home schedule Take 50 mg by mouth daily. 50 mg  
    
   
   
   
  
 magnesium oxide 500 mg Tab Your next dose is:  Tomorrow Morning Take 250 mg by mouth daily. 250 mg  
    
  
   
   
   
  
 melatonin 3 mg tablet Your next dose is: Take tonight Take 5 mg by mouth nightly. 5 mg  
    
   
   
   
  
  
 metoprolol tartrate 25 mg tablet Commonly known as:  LOPRESSOR Your next dose is: This evening Take 25 mg by mouth two (2) times a day. 25 mg MOBIC 7.5 mg tablet Generic drug:  meloxicam  
Your next dose is:  Tomorrow Morning Take 7.5 mg by mouth daily. 7.5 mg  
    
  
   
   
   
  
 multivitamin tablet Commonly known as:  ONE A DAY Your next dose is:  Tomorrow Morning Take 1 Tab by mouth daily. 1 Tab TOPAMAX 100 mg tablet Generic drug:  topiramate Your next dose is: This evening Take 100 mg by mouth two (2) times a day. 100 mg  
    
  
   
   
  
   
  
 traZODone 50 mg tablet Commonly known as:  David Console Your next dose is: Take tonight Take 50 mg by mouth nightly. 50 mg  
    
   
   
   
  
  
 VISTARIL 25 mg capsule Generic drug:  hydrOXYzine pamoate Your next dose is: Take on as needed schedule Take 25 mg by mouth two (2) times daily as needed for Itching. 25 mg  
    
   
   
   
  
 VITAMIN D3 1,000 unit tablet Generic drug:  cholecalciferol Your next dose is:  Tomorrow Morning Take 1,000 Units by mouth daily. 1000 Units ZyrTEC 10 mg tablet Generic drug:  cetirizine Your next dose is:  Tomorrow Morning Take  by mouth daily. * Notice: This list has 2 medication(s) that are the same as other medications prescribed for you. Read the directions carefully, and ask your doctor or other care provider to review them with you. STOP taking these medications   
 ibuprofen 800 mg tablet Commonly known as:  MOTRIN Where to Get Your Medications Information on where to get these meds will be given to you by the nurse or doctor. ! Ask your nurse or doctor about these medications diphenhydrAMINE 25 mg capsule  
 metoclopramide HCl 5 mg tablet Discharge Instructions Migraine Headache: Care Instructions Your Care Instructions Migraines are painful, throbbing headaches that often start on one side of the head. They may cause nausea and vomiting and make you sensitive to light, sound, or smell. Without treatment, migraines can last from 4 hours to a few days. Medicines can help prevent migraines or stop them after they have started. Your doctor can help you find which ones work best for you. Follow-up care is a key part of your treatment and safety. Be sure to make and go to all appointments, and call your doctor if you are having problems. It's also a good idea to know your test results and keep a list of the medicines you take. How can you care for yourself at home? · Do not drive if you have taken a prescription pain medicine. · Rest in a quiet, dark room until your headache is gone. Close your eyes, and try to relax or go to sleep. Don't watch TV or read. · Put a cold, moist cloth or cold pack on the painful area for 10 to 20 minutes at a time. Put a thin cloth between the cold pack and your skin. · Use a warm, moist towel or a heating pad set on low to relax tight shoulder and neck muscles. · Have someone gently massage your neck and shoulders. · Take your medicines exactly as prescribed. Call your doctor if you think you are having a problem with your medicine. You will get more details on the specific medicines your doctor prescribes. · Be careful not to take pain medicine more often than the instructions allow. You could get worse or more frequent headaches when the medicine wears off. To prevent migraines · Keep a headache diary so you can figure out what triggers your headaches. Avoiding triggers may help you prevent headaches. Record when each headache began, how long it lasted, and what the pain was like. (Was it throbbing, aching, stabbing, or dull?) Write down any other symptoms you had with the headache, such as nausea, flashing lights or dark spots, or sensitivity to bright light or loud noise. Note if the headache occurred near your period. List anything that might have triggered the headache. Triggers may include certain foods (chocolate, cheese, wine) or odors, smoke, bright light, stress, or lack of sleep. · If your doctor has prescribed medicine for your migraines, take it as directed.  You may have medicine that you take only when you get a migraine and medicine that you take all the time to help prevent migraines. ¨ If your doctor has prescribed medicine for when you get a headache, take it at the first sign of a migraine, unless your doctor has given you other instructions. ¨ If your doctor has prescribed medicine to prevent migraines, take it exactly as prescribed. Call your doctor if you think you are having a problem with your medicine. · Find healthy ways to deal with stress. Migraines are most common during or right after stressful times. Take time to relax before and after you do something that has caused a migraine in the past. 
· Try to keep your muscles relaxed by keeping good posture. Check your jaw, face, neck, and shoulder muscles for tension. Try to relax them. When you sit at a desk, change positions often. And make sure to stretch for 30 seconds each hour. · Get plenty of sleep and exercise. · Eat meals on a regular schedule. Avoid foods and drinks that often trigger migraines. These include chocolate, alcohol (especially red wine and port), aspartame, monosodium glutamate (MSG), and some additives found in foods (such as hot dogs, swain, cold cuts, aged cheeses, and pickled foods). · Limit caffeine. Don't drink too much coffee, tea, or soda. But don't quit caffeine suddenly. That can also give you migraines. · Do not smoke or allow others to smoke around you. If you need help quitting, talk to your doctor about stop-smoking programs and medicines. These can increase your chances of quitting for good. · If you are taking birth control pills or hormone therapy, talk to your doctor about whether they are triggering your migraines. When should you call for help? Call 911 anytime you think you may need emergency care. For example, call if: 
  · You have signs of a stroke. These may include: 
¨ Sudden numbness, paralysis, or weakness in your face, arm, or leg, especially on only one side of your body. ¨ Sudden vision changes. ¨ Sudden trouble speaking. ¨ Sudden confusion or trouble understanding simple statements. ¨ Sudden problems with walking or balance. ¨ A sudden, severe headache that is different from past headaches.  
 Call your doctor now or seek immediate medical care if: 
  · You have new or worse nausea and vomiting.  
  · You have a new or higher fever.  
  · Your headache gets much worse.  
 Watch closely for changes in your health, and be sure to contact your doctor if: 
  · You are not getting better after 2 days (48 hours). Where can you learn more? Go to http://nracisa-prince.info/. Enter N515 in the search box to learn more about \"Migraine Headache: Care Instructions. \" Current as of: June 4, 2018 Content Version: 11.8 © 2517-6655 [email or web address]. Care instructions adapted under license by [employer] (which disclaims liability or warranty for this information). If you have questions about a medical condition or this instruction, always ask your healthcare professional. [place] [address] any warranty or liability for your use of this information. DISCHARGE SUMMARY from Nurse PATIENT INSTRUCTIONS: 
 
 
F-face looks uneven A-arms unable to move or move unevenly S-speech slurred or non-existent T-time-call 911 as soon as signs and symptoms begin-DO NOT go Back to bed or wait to see if you get better-TIME IS BRAIN. Warning Signs of HEART ATTACK Call 911 if you have these symptoms: 
? Chest discomfort. Most heart attacks involve discomfort in the center of the chest that lasts more than a few minutes, or that goes away and comes back. It can feel like uncomfortable pressure, squeezing, fullness, or pain. ? Discomfort in other areas of the upper body.  Symptoms can include pain or discomfort in one or both arms, the back, neck, jaw, or stomach. ? Shortness of breath with or without chest discomfort. ? Other signs may include breaking out in a cold sweat, nausea, or lightheadedness. Don't wait more than five minutes to call [address]! Fast action can save your life. Calling 911 is almost always the fastest way to get lifesaving treatment. Emergency Medical Services staff can begin treatment when they arrive  up to an hour sooner than if someone gets to the hospital by car. The discharge information has been reviewed with the patient. The patient verbalized understanding. Discharge medications reviewed with the patient and appropriate educational materials and side effects teaching were provided. ___________________________________________________________________________________________________________________________________ [employer] Announcement We are excited to announce that we are making your provider's discharge notes available to you in [employer]. You will see these notes when they are completed and signed by the physician that discharged you from your recent hospital stay. If you have any questions or concerns about any information you see in [employer], please call the Health Information Department where you were seen or reach out to your Primary Care Provider for more information about your plan of care. Introducing [facility]! Dear James Beard: Thank you for requesting a [employer] account. Our records indicate that you already have an active [employer] account. You can access your account anytime at [email or web address] Did you know that you can access your hospital and ER discharge instructions at any time in [employer]? You can also review all of your test results from your hospital stay or ER visit. Additional Information If you have questions, please visit the Frequently Asked Questions section of the [employer] website at [email or web address] Remember, [employer] is NOT to be used for urgent needs. For medical emergencies, dial 911. Now available from your iPhone and Android! Introducing Tino Hernandez As a [facility] patient, I wanted to make you aware of our electronic visit tool called Tino Hernandez. [employer] allows you to connect within minutes with a medical provider 24 hours a day, seven days a week via a mobile device or tablet or logging into a secure website from your computer. You can access Tino Hernandez from anywhere in the [place]. A virtual visit might be right for you when you have a simple condition and feel like you just dont want to get out of bed, or cant get away from work for an appointment, when your regular [facility] provider is not available (evenings, weekends or holidays), or when youre out of town and need minor care. Electronic visits cost only $49 and if the [employer] provider determines a prescription is needed to treat your condition, one can be electronically transmitted to a nearby pharmacy*. Please take a moment to enroll today if you have not already done so. The enrollment process is free and takes just a few minutes. To enroll, please download the [employer] мария to your tablet or phone, or visit [email or web address] to enroll on your computer. And, as an [address] [place] [address] patient with a [employer] account, the results of your visits will be scanned into your electronic medical record and your primary care provider will be able to view the scanned results. We urge you to continue to see your regular [facility] provider for your ongoing medical care. And while your primary care provider may not be the one available when you seek a Tino Hernandez virtual visit, the peace of mind you get from getting a real diagnosis real time can be priceless. For more information on Tino Florenciorachel, view our Frequently Asked Questions (FAQs) at [email or web address]. Sincerely, 
 
Kate Leon MD 
Chief Medical Officer Ludmila Martinez *:  certain medications cannot be prescribed via Tino Hernandez Providers Seen During Your Hospitalization Provider Specialty Primary office phone Davina Klein, [address] [facility] Internal Medicine [phone] Your Primary Care Physician (PCP) Primary Care Physician Office Phone Office Fax NOT ON FILE ** None ** ** None ** You are allergic to the following Allergen Reactions Chantix (Varenicline) Other (comments) Codeine Hives Dilaudid (Hydromorphone (Bulk)) Nausea and Vomiting Influenza Virus Vaccines Swelling Medrol (Methylprednisolone) Nausea and Vomiting Prednisone Nausea and Vomiting  
    
 Sulfa (Sulfonamide Antibiotics) Other (comments) Yeast infection Recent Documentation OB Status Smoking Status Hysterectomy Former Smoker Emergency Contacts Name Discharge Info Relation Home Work Mobile Orion Murray DISCHARGE CAREGIVER [3] Spouse [3] [phone] JeisonFrance  Other Relative [6]   [phone] Patient Belongings The following personal items are in your possession at time of discharge: 
  Dental Appliances: With patient  Visual Aid: Glasses, With patient      Home Medications: Sent home   Jewelry: None  Clothing: At bedside    Other Valuables: Cell Phone Please provide this summary of care documentation to your next provider. Signatures-by signing, you are acknowledging that this After Visit Summary has been reviewed with you and you have received a copy. Patient Signature:  ____________________________________________________________  Date:  ____________________________________________________________  
  
[facility]    
    
 Provider Signature:  ____________________________________________________________ Date:  ____________________________________________________________

## 2018-10-10 LAB
ATRIAL RATE: 79 BPM
CALCULATED P AXIS, ECG09: 44 DEGREES
CALCULATED R AXIS, ECG10: -33 DEGREES
CALCULATED T AXIS, ECG11: -4 DEGREES
DIAGNOSIS, 93000: NORMAL
P-R INTERVAL, ECG05: 152 MS
Q-T INTERVAL, ECG07: 390 MS
QRS DURATION, ECG06: 98 MS
QTC CALCULATION (BEZET), ECG08: 447 MS
VENTRICULAR RATE, ECG03: 79 BPM

## 2018-10-10 PROCEDURE — 74011250637 HC RX REV CODE- 250/637: Performed by: INTERNAL MEDICINE

## 2018-10-10 PROCEDURE — 74011000250 HC RX REV CODE- 250: Performed by: INTERNAL MEDICINE

## 2018-10-10 PROCEDURE — 74011250636 HC RX REV CODE- 250/636: Performed by: INTERNAL MEDICINE

## 2018-10-10 PROCEDURE — 64450 NJX AA&/STRD OTHER PN/BRANCH: CPT

## 2018-10-10 PROCEDURE — 99218 HC RM OBSERVATION: CPT

## 2018-10-10 PROCEDURE — 74011250636 HC RX REV CODE- 250/636: Performed by: PSYCHIATRY & NEUROLOGY

## 2018-10-10 PROCEDURE — 99223 1ST HOSP IP/OBS HIGH 75: CPT | Performed by: PSYCHIATRY & NEUROLOGY

## 2018-10-10 PROCEDURE — 74011250637 HC RX REV CODE- 250/637: Performed by: PSYCHIATRY & NEUROLOGY

## 2018-10-10 RX ORDER — DIPHENHYDRAMINE HCL 25 MG
25 CAPSULE ORAL EVERY 6 HOURS
Status: DISCONTINUED | OUTPATIENT
Start: 2018-10-10 | End: 2018-10-11 | Stop reason: HOSPADM

## 2018-10-10 RX ORDER — LIDOCAINE HYDROCHLORIDE 10 MG/ML
10 INJECTION INFILTRATION; PERINEURAL ONCE
Status: COMPLETED | OUTPATIENT
Start: 2018-10-10 | End: 2018-10-10

## 2018-10-10 RX ORDER — BETAMETHASONE SODIUM PHOSPHATE AND BETAMETHASONE ACETATE 3; 3 MG/ML; MG/ML
6 INJECTION, SUSPENSION INTRA-ARTICULAR; INTRALESIONAL; INTRAMUSCULAR; SOFT TISSUE ONCE
Status: COMPLETED | OUTPATIENT
Start: 2018-10-10 | End: 2018-10-10

## 2018-10-10 RX ORDER — DULOXETIN HYDROCHLORIDE 60 MG/1
60 CAPSULE, DELAYED RELEASE ORAL DAILY
Status: DISCONTINUED | OUTPATIENT
Start: 2018-10-11 | End: 2018-10-11 | Stop reason: HOSPADM

## 2018-10-10 RX ORDER — LIDOCAINE HYDROCHLORIDE 10 MG/ML
5 INJECTION, SOLUTION EPIDURAL; INFILTRATION; INTRACAUDAL; PERINEURAL ONCE
Status: DISPENSED | OUTPATIENT
Start: 2018-10-10 | End: 2018-10-10

## 2018-10-10 RX ORDER — TOPIRAMATE 100 MG/1
100 TABLET, FILM COATED ORAL DAILY
Status: DISCONTINUED | OUTPATIENT
Start: 2018-10-11 | End: 2018-10-11 | Stop reason: HOSPADM

## 2018-10-10 RX ADMIN — METOCLOPRAMIDE 10 MG: 5 INJECTION, SOLUTION INTRAMUSCULAR; INTRAVENOUS at 12:03

## 2018-10-10 RX ADMIN — METOPROLOL TARTRATE 25 MG: 25 TABLET ORAL at 17:07

## 2018-10-10 RX ADMIN — LAMOTRIGINE 50 MG: 100 TABLET ORAL at 08:02

## 2018-10-10 RX ADMIN — DULOXETINE HYDROCHLORIDE 60 MG: 60 CAPSULE, DELAYED RELEASE ORAL at 08:02

## 2018-10-10 RX ADMIN — LIDOCAINE HYDROCHLORIDE 10 ML: 10 INJECTION, SOLUTION EPIDURAL; INFILTRATION; INTRACAUDAL; PERINEURAL at 12:00

## 2018-10-10 RX ADMIN — LAMOTRIGINE 100 MG: 100 TABLET ORAL at 20:58

## 2018-10-10 RX ADMIN — IBUPROFEN 800 MG: 400 TABLET ORAL at 08:02

## 2018-10-10 RX ADMIN — METOCLOPRAMIDE 10 MG: 5 INJECTION, SOLUTION INTRAMUSCULAR; INTRAVENOUS at 17:07

## 2018-10-10 RX ADMIN — DIPHENHYDRAMINE HYDROCHLORIDE 25 MG: 25 CAPSULE ORAL at 17:07

## 2018-10-10 RX ADMIN — FAMOTIDINE 20 MG: 10 INJECTION, SOLUTION INTRAVENOUS at 08:03

## 2018-10-10 RX ADMIN — HEPARIN SODIUM 5000 UNITS: 5000 INJECTION INTRAVENOUS; SUBCUTANEOUS at 08:03

## 2018-10-10 RX ADMIN — BETAMETHASONE SODIUM PHOSPHATE AND BETAMETHASONE ACETATE 6 MG: 3; 3 INJECTION, SUSPENSION INTRA-ARTICULAR; INTRALESIONAL; INTRAMUSCULAR at 11:00

## 2018-10-10 RX ADMIN — ROSUVASTATIN CALCIUM 20 MG: 20 TABLET, FILM COATED ORAL at 20:55

## 2018-10-10 RX ADMIN — FAMOTIDINE 20 MG: 10 INJECTION, SOLUTION INTRAVENOUS at 20:55

## 2018-10-10 RX ADMIN — IBUPROFEN 800 MG: 400 TABLET ORAL at 17:07

## 2018-10-10 RX ADMIN — BETAMETHASONE SODIUM PHOSPHATE AND BETAMETHASONE ACETATE 6 MG: 3; 3 INJECTION, SUSPENSION INTRA-ARTICULAR; INTRALESIONAL; INTRAMUSCULAR at 12:00

## 2018-10-10 RX ADMIN — METOPROLOL TARTRATE 25 MG: 25 TABLET ORAL at 08:03

## 2018-10-10 RX ADMIN — METOCLOPRAMIDE 10 MG: 5 INJECTION, SOLUTION INTRAMUSCULAR; INTRAVENOUS at 05:18

## 2018-10-10 RX ADMIN — HEPARIN SODIUM 5000 UNITS: 5000 INJECTION INTRAVENOUS; SUBCUTANEOUS at 20:55

## 2018-10-10 RX ADMIN — RAMIPRIL 2.5 MG: 2.5 CAPSULE ORAL at 08:02

## 2018-10-10 RX ADMIN — IBUPROFEN 800 MG: 400 TABLET ORAL at 20:54

## 2018-10-10 RX ADMIN — TOPIRAMATE 100 MG: 100 TABLET, FILM COATED ORAL at 08:02

## 2018-10-10 NOTE — PROGRESS NOTES
Problem: Falls - Risk of 
Goal: *Absence of Falls Document Genevive Camera Fall Risk and appropriate interventions in the flowsheet. Outcome: Progressing Towards Goal 
Fall Risk Interventions: 
  
 
  
 
Medication Interventions: Assess postural VS orthostatic hypotension, Bed/chair exit alarm, Patient to call before getting OOB, Teach patient to arise slowly

## 2018-10-10 NOTE — PROGRESS NOTES
Problem: Interdisciplinary Rounds Goal: Interdisciplinary Rounds Outcome: Progressing Towards Goal 
Interdisciplinary team rounds were held 10/10/2018 with the following team members:Care Management, Physical Therapy, Physician and  and the patient. Anticipate discharge home tomorrow. Plan of care discussed. See clinical pathway and/or care plan for interventions and desired outcomes.

## 2018-10-10 NOTE — PROCEDURES
Procedure Note: Bilateral Greater Occipital Nerve Block  Indications: severe bilateral occipital pain  Informed consent was obtained (explaining the procedure and risks and benefits of procedure) from patient: the signed consent form was placed in the medical record. A time out was completed, verifying correct patient, procedure,site, positioning, and implants or special equipment. Patient's left occipital area was palpated to identify location of greater occipital nerve. Alcohol was applied topically to the skin. Using a 25 gauge needle (aspirating during insertion), 3 cc of a mixture of betamethasone and 1% lidocaine (1cc and, 2cc respectively drawn up into syringe) was injected on the left side (directing needle to center, left and right of painful focus). Pressure with a gauze pad was held briefly upon the site of puncture to minimize bleeding and to further spread anaesthetic subcutaneously. The procedure was repeated on the right side, injecting a further 3 cc on that side. There were no complications. Patient was comfortable and left without complaint.

## 2018-10-10 NOTE — PROGRESS NOTES
Problem: Falls - Risk of 
Goal: *Absence of Falls Document Lianna Reddy Fall Risk and appropriate interventions in the flowsheet. Outcome: Progressing Towards Goal 
Fall Risk Interventions:

## 2018-10-10 NOTE — CONSULTS
Consult    Patient: Blanca Solis MRN: [id number]     YOB: 1960  Age: 62 y.o. Sex: female      Subjective:      Blanca Solis is a 62 y.o. female who is being seen for intractable headache. The patient has had a continuous headache for nearly 6 weeks. Onset was insidious. She characterizes her pain as sharp. She states that she has an aura with visual phenomena in both eyes, perhaps worse on the left. The duration of her headache has been nearly 6 weeks. She has tried various medications for this without relief. Her headache is located bifrontally. It does not change with positions. She had a recent brain MRI which she tells me was normal.  Her neurologist requested admission for DHE protocol. She does endorse tenderness in the bilateral occipital region. She is on topiramate 100 mg twice a day. Past Medical History:   Diagnosis Date    Allergic rhinitis, cause unspecified     Chronic pain     right knee for years    Depression     with bi-polar disorder , stable at present    Generalized anxiety disorder     Right knee pain     Sleep apnea, obstructive      patient does not use c-pap    Tobacco use disorder     She tried Chantix and it caused her to have nausea, chest tightness and heartburn.  Torn meniscus     right     Past Surgical History:   Procedure Laterality Date    HX APPENDECTOMY      with hysterectomy in 1988    HX HEENT  1965    tonsils    HX HYSTERECTOMY  1988    exlap and hystertectomy      Family History   Problem Relation Age of Onset    COPD Mother      She was a smoker.     Lung Disease Mother     Heart Disease Father      CHF    Osteoporosis Father     Osteoporosis Paternal Grandmother     Arthritis-rheumatoid Brother     Asthma Sister     Osteoporosis Sister      Social History   Substance Use Topics    Smoking status: Former Smoker     Packs/day: 1.00     Quit date: 8/28/2016    Smokeless tobacco: Never Used      Comment: 9/9/14 She is down to 1/2 ppd-she is vaping    Alcohol use Yes      Comment: 2 per month      Current Facility-Administered Medications   Medication Dose Route Frequency Provider Last Rate Last Dose    metoprolol tartrate (LOPRESSOR) tablet 25 mg  25 mg Oral BID Stephen Person MD   25 mg at 10/10/18 0803    ramipril (ALTACE) capsule 2.5 mg  2.5 mg Oral DAILY Stephen Person MD   2.5 mg at 10/10/18 0802    rosuvastatin (CRESTOR) tablet 20 mg  20 mg Oral QHS Stephen Person MD   20 mg at 10/09/18 2114    traZODone (DESYREL) tablet 50 mg  50 mg Oral QHS Stephen Person MD   50 mg at 10/09/18 2115    heparin (porcine) injection 5,000 Units  5,000 Units SubCUTAneous Q12H Stephen Person MD   5,000 Units at 10/10/18 0803    promethazine (PHENERGAN) with saline injection 12.5 mg  12.5 mg IntraVENous Q6H PRN Stephen Person MD        acetaminophen (TYLENOL) tablet 650 mg  650 mg Oral Q4H PRN Stephen Person MD        ibuprofen (MOTRIN) tablet 800 mg  800 mg Oral TID Stephen Person MD   800 mg at 10/10/18 0802    famotidine (PF) (PEPCID) 20 mg in sodium chloride 0.9% 10 mL injection  20 mg IntraVENous Q12H Stephen Person MD   20 mg at 10/10/18 0803    metoclopramide HCl (REGLAN) injection 10 mg  10 mg IntraVENous Q6H Stephen Person MD   10 mg at 10/10/18 0518    lamoTRIgine (LaMICtal) tablet 100 mg  100 mg Oral QHS Stephen Person MD   100 mg at 10/09/18 2114    lamoTRIgine (LaMICtal) tablet 50 mg  50 mg Oral DAILY Stephen Person MD   50 mg at 10/10/18 0802    topiramate (TOPAMAX) tablet 100 mg  100 mg Oral BID WITH MEALS Stephen Person MD   100 mg at 10/10/18 0802    DULoxetine (CYMBALTA) capsule 60 mg  60 mg Oral BID Stephen Person MD   60 mg at 10/10/18 0802    diphenhydrAMINE (BENADRYL) capsule 25 mg  25 mg Oral Q6H PRN Stephen Person MD            Allergies   Allergen Reactions    Chantix [Varenicline] Other (comments)    Codeine Hives    Dilaudid [Hydromorphone (Bulk)] Nausea and Vomiting    Influenza Virus Vaccines Swelling    Medrol [Methylprednisolone] Nausea and Vomiting    Prednisone Nausea and Vomiting    Sulfa (Sulfonamide Antibiotics) Other (comments)     Yeast infection       Review of Systems:  CONSTITUTIONAL: No weight loss, fever, chills, weakness or fatigue. HEENT: Eyes: No visual loss, blurred vision, double vision or yellow sclerae. Ears, Nose, Throat: No hearing loss, sneezing, congestion, runny nose or sore throat. SKIN: No rash or itching. CARDIOVASCULAR: No chest pain, chest pressure or chest discomfort. No palpitations or edema. RESPIRATORY: No shortness of breath, cough or sputum. GASTROINTESTINAL: No anorexia, nausea, vomiting or diarrhea. No abdominal pain or blood. GENITOURINARY: no burning with urination. NEUROLOGICAL: see above. MUSCULOSKELETAL: No muscle, back pain, joint pain or stiffness. HEMATOLOGIC: No anemia, bleeding or bruising. LYMPHATICS: No enlarged nodes. No history of splenectomy. PSYCHIATRIC: positive history of depression and anxiety. ENDOCRINOLOGIC: No reports of sweating, cold or heat intolerance. No polyuria or polydipsia. ALLERGIES: No history of asthma, hives, eczema or rhinitis. Objective:     Vitals:    10/09/18 1543 10/10/18 0017 10/10/18 0447 10/10/18 0714   BP: 131/84 95/60 117/75 99/64   Pulse: 77 63 67 68   Resp: 18 18 18 18   Temp: 97.8 °F (36.6 °C) 98.1 °F (36.7 °C) 98 °F (36.7 °C) 97.8 °F (36.6 °C)   SpO2: 96% 97% 97% 95%        Physical Exam:  General - Well developed, well nourished, in no apparent distress. Pleasant and conversent. HEENT - Normocephalic, atraumatic. Conjunctiva, tympanic membranes, and oropharynx are clear. Neck - Supple without masses, no bruits   Cardiovascular - Regular rate and rhythm. Normal S1, S2 without murmurs, rubs, or gallops. Lungs - Clear to auscultation. Abdomen - Soft, nontender with normal bowel sounds. Extremities - Peripheral pulses intact. No edema and no rashes.      Neurological examination - Comprehension, attention , memory and reasoning are intact. Language and speech are normal. On cranial nerve examination pupils are equal round and reactive to light. Fundoscopic examination is normal. Visual acuity is adequate. Visual fields are full to finger confrontation. Extraocular motility is normal. Face is symmetric and sensation is intact to light touch. Hearing is intact to finger rustle bilaterally. Motor examination - There is normal muscle tone and bulk. Power is full throughout. Muscle stretch reflexes are normoactive and there are no pathological reflexes present. Sensation is intact to light touch, pinprick, vibration and proprioception in all extremities. Cerebellar examination is normal. Gait and stance are normal.         Lab Results   Component Value Date/Time    Cholesterol, total 140 10/09/2018 05:53 PM    HDL Cholesterol 49 10/09/2018 05:53 PM    LDL, calculated 67.2 10/09/2018 05:53 PM    VLDL, calculated 23.8 (H) 10/09/2018 05:53 PM    Triglyceride 119 10/09/2018 05:53 PM    CHOL/HDL Ratio 2.9 10/09/2018 05:53 PM        Lab Results   Component Value Date/Time    Hemoglobin A1c 5.7 (H) 09/09/2014 08:30 AM        CT Results (most recent): Personally Reviewed     [facility]   [facility] HEAD WO CONT   Narrative CT HEAD WITHOUT CONTRAST. INDICATION: Persistent headache and nausea. COMPARISON: February 2014. TECHNIQUE:   5 mm axial scans from the skull base to the vertex. Our CT  scanners use one or more of the following:  Automated exposure control,  adjustment of the mA and or kV according to patient size, iterative  reconstruction. FINDINGS:  No acute intraparenchymal hemorrhage or abnormal extra-axial fluid  collection. The ventricles are normal size. No midline shift mass effect. Included portion of the paranasal sinuses and orbits grossly unremarkable. No  skull fracture.          Impression IMPRESSION:  Negative for acute intracranial abnormality. Results for orders placed or performed during the hospital encounter of 10/09/18   EKG, 12 LEAD, INITIAL   Result Value Ref Range    Ventricular Rate 79 BPM    Atrial Rate 79 BPM    P-R Interval 152 ms    QRS Duration 98 ms    Q-T Interval 390 ms    QTC Calculation (Bezet) 447 ms    Calculated P Axis 44 degrees    Calculated R Axis -33 degrees    Calculated T Axis -4 degrees    Diagnosis       Normal sinus rhythm  Left axis deviation  Septal infarct , age undetermined  Abnormal ECG    Confirmed by MARIELLE PUTNAM (), Mahesh Bueno ([id number]) on 10/10/2018 6:14:23 AM              Assessment:     Status migrainosus: The patient has migraine with aura and she is a smoker. She has an abnormal ECG. For these reasons, DHE carries significant risk. She does have some occipital region tenderness to palpation. I will try occipital nerve block, bilaterally which can be a very effective treatment for status migrainosus. Plan:     I will come by later and perform bilateral occipital nerve blocks. We will see how she responds to this and then consider other medications for status migrainosus. A Depakote load can often be effective however there will be an interaction between Depakote and topiramate. When topiramate reaches 200 mg daily it becomes an enzyme inducer. Headache is a common side effect of duloxetine. I will lower her dose. Aggressive hydration. Signed By: Sveta Domínguez DO     October 10, 2018      I spent 70 minutes with this patient, over 50% of that time was counseling the patient on her neurological condition.

## 2018-10-10 NOTE — PROGRESS NOTES
Hospitalist Progress Note 10/10/2018 Admit Date: 10/9/2018  3:29 PM  
NAME: Mirza Jackson :  1960 DOS:              10/10/18 MRN:  [id number] Attending: Radha Diaz MD 
PCP:  Not On File [facility] Treatment Team: Attending Provider: Holli Núñez MD; Consulting Provider: Noa Cowan DO; Charge Nurse: Maggie Harris; Utilization Review: Dory Mcclellan RN; Care Manager: Ruthann Cha RN No Order SUBJECTIVE: As previously documented:57 y.o. female who has a PMH of migraine headaches, chronic tachycardia, depression, dyslipidemia, insomnia who was referred by an outside neurologist ( Dr. Ariadna Kong ) in view if intractable migraine headaches. Patient admitted as direct admission to the floor. Neurology evaluated patient who recommended b/l occipital nerves block. 10/10/18    Sindi Eddy Book stated still having HA, about 4/10 intensity. 10+ ROS reviewed and negative except for positive in HPI. Allergies Allergen Reactions  Chantix [Varenicline] Other (comments)  Codeine Hives  Dilaudid [Hydromorphone (Bulk)] Nausea and Vomiting  Influenza Virus Vaccines Swelling  Medrol [Methylprednisolone] Nausea and Vomiting  Prednisone Nausea and Vomiting  Sulfa (Sulfonamide Antibiotics) Other (comments) Yeast infection Current Facility-Administered Medications Medication Dose Route Frequency  [START ON 10/11/2018] DULoxetine (CYMBALTA) capsule 60 mg  60 mg Oral DAILY  lidocaine (PF) (XYLOCAINE) 10 mg/mL (1 %) injection 0.5 mL  5 mg IntraDERMal ONCE  
 metoprolol tartrate (LOPRESSOR) tablet 25 mg  25 mg Oral BID  ramipril (ALTACE) capsule 2.5 mg  2.5 mg Oral DAILY  rosuvastatin (CRESTOR) tablet 20 mg  20 mg Oral QHS  heparin (porcine) injection 5,000 Units  5,000 Units SubCUTAneous Q12H  promethazine (PHENERGAN) with saline injection 12.5 mg  12.5 mg IntraVENous Q6H PRN  
  acetaminophen (TYLENOL) tablet 650 mg  650 mg Oral Q4H PRN  
 ibuprofen (MOTRIN) tablet 800 mg  800 mg Oral TID  famotidine (PF) (PEPCID) 20 mg in sodium chloride 0.9% 10 mL injection  20 mg IntraVENous Q12H  
 metoclopramide HCl (REGLAN) injection 10 mg  10 mg IntraVENous Q6H  
 lamoTRIgine (LaMICtal) tablet 100 mg  100 mg Oral QHS  lamoTRIgine (LaMICtal) tablet 50 mg  50 mg Oral DAILY  topiramate (TOPAMAX) tablet 100 mg  100 mg Oral BID WITH MEALS  diphenhydrAMINE (BENADRYL) capsule 25 mg  25 mg Oral Q6H PRN There is no immunization history on file for this patient. Objective:  
Patient Vitals for the past 24 hrs: 
 Temp Pulse Resp BP SpO2  
10/10/18 1430 98.3 °F (36.8 °C) 72 17 107/69 97 % 10/10/18 1100 98.1 °F (36.7 °C) 78 16 98/63 96 % 10/10/18 0714 97.8 °F (36.6 °C) 68 18 99/64 95 % 10/10/18 0447 98 °F (36.7 °C) 67 18 117/75 97 % 10/10/18 0017 98.1 °F (36.7 °C) 63 18 95/60 97 % 10/09/18 1543 97.8 °F (36.6 °C) 77 18 131/84 96 % Temp (24hrs), Av °F (36.7 °C), Min:97.8 °F (36.6 °C), Max:98.3 °F (36.8 °C) Oxygen Therapy O2 Sat (%): 97 % (10/10/18 1430) Oxygen Therapy O2 Sat (%): 97 % (10/10/18 1430) Physical Exam: 
General:         Alert, cooperative, no distress HEENT:               NCAT. No obvious deformity. Nares normal. No drainage Lungs: No wheezing/rhonchi/rales Cardiovascular:   RRR. No m/r/g. No pedal edema b/l. Abdomen:       S/nt/nd. Bowel sounds normal. .  
Skin:         No rashes or lesions. Not Jaundiced Neurologic:     CN II- XII  WNL. No gross focal deficit. Moves all extremities. Psychiatric:         Good mood. Normal affect. DIAGNOSTIC STUDIES Data Review:  
Recent Results (from the past 24 hour(s)) METABOLIC PANEL, COMPREHENSIVE Collection Time: 10/09/18  5:53 PM  
Result Value Ref Range Sodium 139 136 - 145 mmol/L Potassium 4.4 3.5 - 5.1 mmol/L  Chloride 111 (H) 98 - 107 mmol/L  
 CO2 23 21 - 32 mmol/L Anion gap 5 (L) 7 - 16 mmol/L Glucose 80 65 - 100 mg/dL BUN 12 6 - 23 MG/DL Creatinine 1.26 (H) 0.6 - 1.0 MG/DL  
 GFR est AA 56 (L) >60 ml/min/1.73m2 GFR est non-AA 47 (L) >60 ml/min/1.73m2 Calcium 8.8 8.3 - 10.4 MG/DL Bilirubin, total 0.3 0.2 - 1.1 MG/DL  
 ALT (SGPT) 28 12 - 65 U/L  
 AST (SGOT) 17 15 - 37 U/L Alk. phosphatase 52 50 - 136 U/L Protein, total 7.1 6.3 - 8.2 g/dL Albumin 4.1 3.5 - 5.0 g/dL Globulin 3.0 2.3 - 3.5 g/dL A-G Ratio 1.4 1.2 - 3.5    
CBC WITH AUTOMATED DIFF Collection Time: 10/09/18  5:53 PM  
Result Value Ref Range WBC 8.4 4.3 - 11.1 K/uL  
 RBC 4.73 4.05 - 5.2 M/uL  
 HGB 14.6 11.7 - 15.4 g/dL HCT 43.6 35.8 - 46.3 % MCV 92.2 79.6 - 97.8 FL  
 MCH 30.9 26.1 - 32.9 PG  
 MCHC 33.5 31.4 - 35.0 g/dL  
 RDW 13.7 % PLATELET 450 483 - 453 K/uL MPV 9.9 9.4 - 12.3 FL ABSOLUTE NRBC 0.00 0.0 - 0.2 K/uL  
 DF AUTOMATED NEUTROPHILS 56 43 - 78 % LYMPHOCYTES 33 13 - 44 % MONOCYTES 5 4.0 - 12.0 % EOSINOPHILS 5 0.5 - 7.8 % BASOPHILS 1 0.0 - 2.0 % IMMATURE GRANULOCYTES 0 0.0 - 5.0 %  
 ABS. NEUTROPHILS 4.7 1.7 - 8.2 K/UL  
 ABS. LYMPHOCYTES 2.8 0.5 - 4.6 K/UL  
 ABS. MONOCYTES 0.4 0.1 - 1.3 K/UL  
 ABS. EOSINOPHILS 0.4 0.0 - 0.8 K/UL  
 ABS. BASOPHILS 0.1 0.0 - 0.2 K/UL  
 ABS. IMM. GRANS. 0.0 0.0 - 0.5 K/UL  
TSH 3RD GENERATION Collection Time: 10/09/18  5:53 PM  
Result Value Ref Range TSH 1.660 0.358 - 3.740 uIU/mL  
LIPID PANEL Collection Time: 10/09/18  5:53 PM  
Result Value Ref Range LIPID PROFILE Cholesterol, total 140 <200 MG/DL Triglyceride 119 35 - 150 MG/DL  
 HDL Cholesterol 49 40 - 60 MG/DL  
 LDL, calculated 67.2 <100 MG/DL VLDL, calculated 23.8 (H) 6.0 - 23.0 MG/DL  
 CHOL/HDL Ratio 2.9 EKG, 12 LEAD, INITIAL Collection Time: 10/09/18  6:41 PM  
Result Value Ref Range Ventricular Rate 79 BPM  
 Atrial Rate 79 BPM  
 P-R Interval 152 ms QRS Duration 98 ms Q-T Interval 390 ms QTC Calculation (Bezet) 447 ms Calculated P Axis 44 degrees Calculated R Axis -33 degrees Calculated T Axis -4 degrees Diagnosis Normal sinus rhythm Left axis deviation Septal infarct , age undetermined Abnormal ECG Confirmed by MARIELLE PUTNAM (), Diana Suarez ([id number]) on 10/10/2018 6:14:23 AM 
  
 
 
All Micro Results None Imaging Roetta Davey /Studies: CXR Results  (Last 48 hours) 10/09/18 1649  XR CHEST SNGL V Final result Impression:  IMPRESSION:  NO ACUTE CARDIOPULMONARY DISEASE IDENTIFIED. Narrative:  PORTABLE CHEST, October 9, 2018 at 1645 hours CLINICAL HISTORY:  Headache. COMPARISON:  August 6, 2007. FINDINGS:  AP erect image demonstrates no confluent infiltrate or significant  
pleural fluid. The heart size is within normal limits without evidence of  
congestive heart failure or pneumothorax. The bony thorax appears intact on  
this view. CT Results  (Last 48 hours) None Xr Chest Sngl V Result Date: 10/9/2018 IMPRESSION:  NO ACUTE CARDIOPULMONARY DISEASE IDENTIFIED. No results found for this visit on 10/09/18. Labs and Studies from previous 24 hours have been personally reviewed by myself   
ASSESSMENT Active Hospital Problems Diagnosis Date Noted  Intractable migraine 10/09/2018  Depression  Generalized anxiety disorder  Sleep apnea, obstructive She sleeps with a CPAP.  Tobacco use disorder She tried Chantix and it caused her to have nausea, chest tightness and heartburn. Hospital Problems as of 10/10/2018  Date Reviewed: 10/9/2018 Codes Class Noted - Resolved POA * (Principal)Intractable migraine ICD-10-CM: O81.302 ICD-9-CM: 346.91  10/9/2018 - Present Unknown Generalized anxiety disorder ICD-10-CM: F41.1 ICD-9-CM: 300.02  Unknown - Present Yes Depression ICD-10-CM: F32.9 ICD-9-CM: 311  Unknown - Present Yes Sleep apnea, obstructive ICD-10-CM: G47.33 
ICD-9-CM: 327.23  Unknown - Present Yes Overview Signed 7/18/2014  5:42 PM by Mike Elizalde She sleeps with a CPAP. Tobacco use disorder ICD-10-CM: F17.200 ICD-9-CM: 305.1  Unknown - Present Yes Overview Signed 7/18/2014  5:43 PM by Mike Elizalde She tried Chantix and it caused her to have nausea, chest tightness and heartburn. A/P: 
 
-Intractable migraines Neurology evaluation appreciated Patient to have occipital nerve block today. if HA is not improvement likely medications will be adjusted 
 
-Depression No active issues Cont home medications 
 
-EMILIA Cont CPAP  qHS DVT Prophylaxis: heparin CODE Status: Full Plan of Care Discussed with: patient. Care team. 
 
 
An Brown MD 
10/10/18

## 2018-10-10 NOTE — PROGRESS NOTES
Met with pt at bedside, pt is alert and oriented x3, states lives with spouse, daughter, son n law and grand child in mobile home with 3 steps to enter, pt states no current DME available at home, pt states prior to admission she was independent with ADLs and driving. PCP-- Dieter Zuñiga NP with [facility] No anticipated DC needs, CM will remain available for needs. Care Management Interventions PCP Verified by CM: Yes Dieter Zuñiga NP-- [facility]) Current Support Network: Lives with Spouse, Own Home Confirm Follow Up Transport: Family Plan discussed with Pt/Family/Caregiver: Yes Freedom of Choice Offered: Yes Discharge Location Discharge Placement: Home

## 2018-10-11 VITALS
TEMPERATURE: 97.6 F | DIASTOLIC BLOOD PRESSURE: 72 MMHG | OXYGEN SATURATION: 96 % | SYSTOLIC BLOOD PRESSURE: 117 MMHG | RESPIRATION RATE: 18 BRPM | HEART RATE: 77 BPM

## 2018-10-11 PROCEDURE — 99232 SBSQ HOSP IP/OBS MODERATE 35: CPT | Performed by: PSYCHIATRY & NEUROLOGY

## 2018-10-11 PROCEDURE — 99218 HC RM OBSERVATION: CPT

## 2018-10-11 PROCEDURE — 74011250637 HC RX REV CODE- 250/637: Performed by: PSYCHIATRY & NEUROLOGY

## 2018-10-11 PROCEDURE — 74011250637 HC RX REV CODE- 250/637: Performed by: INTERNAL MEDICINE

## 2018-10-11 PROCEDURE — 74011000250 HC RX REV CODE- 250: Performed by: INTERNAL MEDICINE

## 2018-10-11 PROCEDURE — 74011250636 HC RX REV CODE- 250/636: Performed by: INTERNAL MEDICINE

## 2018-10-11 RX ORDER — DIPHENHYDRAMINE HCL 25 MG
25 CAPSULE ORAL EVERY 6 HOURS
Qty: 8 CAP | Refills: 0 | Status: SHIPPED | OUTPATIENT
Start: 2018-10-11 | End: 2018-10-13

## 2018-10-11 RX ORDER — METOCLOPRAMIDE 5 MG/1
5 TABLET ORAL
Qty: 6 TAB | Refills: 0 | Status: SHIPPED | OUTPATIENT
Start: 2018-10-11 | End: 2018-10-13

## 2018-10-11 RX ADMIN — METOCLOPRAMIDE 10 MG: 5 INJECTION, SOLUTION INTRAMUSCULAR; INTRAVENOUS at 00:28

## 2018-10-11 RX ADMIN — LAMOTRIGINE 50 MG: 100 TABLET ORAL at 08:31

## 2018-10-11 RX ADMIN — DULOXETINE HYDROCHLORIDE 60 MG: 60 CAPSULE, DELAYED RELEASE ORAL at 08:31

## 2018-10-11 RX ADMIN — RAMIPRIL 2.5 MG: 2.5 CAPSULE ORAL at 08:34

## 2018-10-11 RX ADMIN — FAMOTIDINE 20 MG: 10 INJECTION, SOLUTION INTRAVENOUS at 08:32

## 2018-10-11 RX ADMIN — METOPROLOL TARTRATE 25 MG: 25 TABLET ORAL at 08:31

## 2018-10-11 RX ADMIN — HEPARIN SODIUM 5000 UNITS: 5000 INJECTION INTRAVENOUS; SUBCUTANEOUS at 08:32

## 2018-10-11 RX ADMIN — DIPHENHYDRAMINE HYDROCHLORIDE 25 MG: 25 CAPSULE ORAL at 00:28

## 2018-10-11 RX ADMIN — ACETAMINOPHEN 650 MG: 325 TABLET, FILM COATED ORAL at 05:53

## 2018-10-11 RX ADMIN — METOCLOPRAMIDE 10 MG: 5 INJECTION, SOLUTION INTRAMUSCULAR; INTRAVENOUS at 05:47

## 2018-10-11 RX ADMIN — DIPHENHYDRAMINE HYDROCHLORIDE 25 MG: 25 CAPSULE ORAL at 05:47

## 2018-10-11 RX ADMIN — TOPIRAMATE 100 MG: 100 TABLET, FILM COATED ORAL at 08:31

## 2018-10-11 NOTE — PROGRESS NOTES
Pt was offered CPAP tonight. Pt stated that she no longer has sleep apnea per her most recent sleep study done by [facility] and no longer wears CPAP at home. RN notified.  
 
Mateo Isaac, RT

## 2018-10-11 NOTE — PROGRESS NOTES
Problem: Falls - Risk of 
Goal: *Absence of Falls Document Debria Check Fall Risk and appropriate interventions in the flowsheet. Outcome: Progressing Towards Goal 
Fall Risk Interventions: 
  
 
  
 
Medication Interventions: Evaluate medications/consider consulting pharmacy

## 2018-10-11 NOTE — PROGRESS NOTES
Neurology Daily Progress Note Assessment:  
 
Status migrainosus: The patient has migraine with aura and she is a smoker. She has an abnormal ECG. For these reasons, DHE carries significant risk.   
  
She does have some occipital region tenderness to palpation. Some improvement with occipital nerve block Plan:  
 
Continue Topamax Aggressive hydration Continue Reglan and Benadryl If patient's headache stays at or below a 4, patient can be discharged Subjective: Interval history: 
 
Patient reports some improvement in headache with bilateral occipital nerve blocks, but reports that this improvement has been slight. She does look more comfortable today and reports that her pain is now at a 4. She no longer has photophobia. She reports that the location of her pain has changed and now reports pain originating at the back of the head and radiating across the top of the head to both eyes. History: 
 
Red Pelaez is a 62 y.o. female who is being seen for intractable headache. 
  
The patient has had a continuous headache for nearly 6 weeks. Onset was insidious. She characterizes her pain as sharp. She states that she has an aura with visual phenomena in both eyes, perhaps worse on the left. The duration of her headache has been nearly 6 weeks. She has tried various medications for this without relief. Her headache is located bifrontally. It does not change with positions. She had a recent brain MRI which she tells me was normal.  Her neurologist requested admission for DHE protocol. Review of systems negative with exception of pertinent positives and negatives noted above. Objective:  
 
Vitals:  
 10/10/18 5588 10/10/18 2327 10/11/18 6837 10/11/18 4870 BP: 113/71 114/77 104/68 117/72 Pulse: 84 78 66 77 Resp: 18 18 18 18 Temp: 98.3 °F (36.8 °C) 99 °F (37.2 °C) 98.1 °F (36.7 °C) 97.6 °F (36.4 °C) SpO2: 95% 95% 96% 96% Current Facility-Administered Medications:  
  DULoxetine (CYMBALTA) capsule 60 mg, 60 mg, Oral, DAILY, Saeid Feng DO, 60 mg at 10/11/18 6848   diphenhydrAMINE (BENADRYL) capsule 25 mg, 25 mg, Oral, Q6H, Saeid Feng DO, 25 mg at 10/11/18 0242   topiramate (TOPAMAX) tablet 100 mg, 100 mg, Oral, DAILY, Saeid Feng DO, 100 mg at 10/11/18 0831 
  metoprolol tartrate (LOPRESSOR) tablet 25 mg, 25 mg, Oral, BID, Yazmin Rivera MD, 25 mg at 10/11/18 1504   ramipril (ALTACE) capsule 2.5 mg, 2.5 mg, Oral, DAILY, Yazmin Rivera MD, 2.5 mg at 10/11/18 0834 
  rosuvastatin (CRESTOR) tablet 20 mg, 20 mg, Oral, QHS, Yazmin Rivera MD, 20 mg at 10/10/18 2055   heparin (porcine) injection 5,000 Units, 5,000 Units, SubCUTAneous, Q12H, Yazmin Rivera MD, 5,000 Units at 10/11/18 9540   acetaminophen (TYLENOL) tablet 650 mg, 650 mg, Oral, Q4H PRN, Yazmin Rivera MD, 650 mg at 10/11/18 2386   famotidine (PF) (PEPCID) 20 mg in sodium chloride 0.9% 10 mL injection, 20 mg, IntraVENous, Q12H, Yazmin Rivera MD, 20 mg at 10/11/18 7096 
  metoclopramide HCl (REGLAN) injection 10 mg, 10 mg, IntraVENous, Q6H, Yazmin Rivera MD, 10 mg at 10/11/18 9916   lamoTRIgine (LaMICtal) tablet 100 mg, 100 mg, Oral, QHS, Yazmin Rivera MD, 100 mg at 10/10/18 2058   lamoTRIgine (LaMICtal) tablet 50 mg, 50 mg, Oral, DAILY, Yazmin Rivera MD, 50 mg at 10/11/18 1316 No results found for this or any previous visit (from the past 12 hour(s)). Physical Exam: 
General - Well developed, well nourished, in no apparent distress. Pleasant and conversent. HEENT - Normocephalic, atraumatic. Conjunctiva are clear. Neck - Supple without masses Extremities - Peripheral pulses intact. No edema and no rashes. Neurological examination - Comprehension, attention , memory and reasoning are intact. Language and speech are normal. On cranial nerve examination pupils are equal round and reactive to light.  Visual fields are full to finger confrontation. Extraocular motility is normal. Face is symmetric. Hearing is intact. . Motor examination - There is normal muscle tone and bulk. Power is full throughout. Muscle stretch reflexes are normoactive and there are no pathological reflexes present. Signed By: Arturo Salazar NP October 11, 2018

## 2018-10-11 NOTE — PROGRESS NOTES
Problem: Falls - Risk of 
Goal: *Absence of Falls Document Mark Lewis Fall Risk and appropriate interventions in the flowsheet. Outcome: Progressing Towards Goal 
Fall Risk Interventions: 
  
 
  
 
Medication Interventions: Assess postural VS orthostatic hypotension, Bed/chair exit alarm, Patient to call before getting OOB, Teach patient to arise slowly

## 2018-10-11 NOTE — DISCHARGE SUMMARY
Hospitalist Discharge Summary     Admit Date:  10/9/2018  3:29 PM   Name:  Angelo Wynne   Age:  62 y.o.  :  1960   MRN:  [id number]   PCP:  Not On File [facility]  Treatment Team: Attending Provider: Clarence Gutierrez MD; Consulting Provider: Shirley Browne DO; Utilization Review: Pablito Gillis RN; Care Manager: Dennise Landis RN    Problem List for this Hospitalization:  Hospital Problems as of 10/11/2018  Date Reviewed: 10/9/2018          Codes Class Noted - Resolved POA    * (Principal)Intractable migraine ICD-10-CM: R25.188  ICD-9-CM: 346.91  10/9/2018 - Present Unknown        Generalized anxiety disorder ICD-10-CM: F41.1  ICD-9-CM: 300.02  Unknown - Present Yes        Depression ICD-10-CM: F32.9  ICD-9-CM: 296  Unknown - Present Yes        Sleep apnea, obstructive ICD-10-CM: G47.33  ICD-9-CM: 327.23  Unknown - Present Yes    Overview Signed 2014  5:42 PM by Danny Addison     She sleeps with a CPAP. Tobacco use disorder ICD-10-CM: F17.200  ICD-9-CM: 305.1  Unknown - Present Yes    Overview Signed 2014  5:43 PM by Danny Addison     She tried Chantix and it caused her to have nausea, chest tightness and heartburn. Admission HPI from 10/9/2018:    \" Please refer to HPI for more details \". Hospital Course:    62 y. o. female who has a PMH of migraine headaches, chronic tachycardia, depression, dyslipidemia, insomnia who was referred by an outside neurologist ( Dr. Blaine Quick ) in view if intractable migraine headaches. Patient admitted as direct admission to the floor. Neurology evaluated patient who recommended b/l occipital nerves block. Patient had the procedure with improvement of the symptoms. Neurology recommended to discharged on Topamax, reglan and benadryl. Patient was advised to follow up appointment with primary neurologist. Patient symptoms likely related to migraines.      Follow up instructions below.   Plan was discussed with patient/Neurology . All questions answered. Patient was stable at time of discharge and was instructed to call or return if there are any concerns or recurrence of symptoms. Diagnostic Imaging/Tests:        All Micro Results     None          Labs: Results:       BMP, Mg, Phos Recent Labs      10/09/18   1753   NA  139   K  4.4   CL  111*   CO2  23   AGAP  5*   BUN  12   CREA  1.26*   CA  8.8   GLU  80      CBC Recent Labs      10/09/18   1753   WBC  8.4   RBC  4.73   HGB  14.6   HCT  43.6   PLT  253   GRANS  56   LYMPH  33   EOS  5   MONOS  5   BASOS  1   IG  0   ANEU  4.7   ABL  2.8   GÉNESIS  0.4   ABM  0.4   ABB  0.1   AIG  0.0      LFT Recent Labs      10/09/18   1753   SGOT  17   ALT  28   AP  52   TP  7.1   ALB  4.1   GLOB  3.0   AGRAT  1.4      Cardiac Testing No results found for: BNPP, BNP, CPK, RCK1, RCK2, RCK3, RCK4, CKMB, CKNDX, CKND1, TROPT, TROIQ   Coagulation Tests No results found for: PTP, INR, APTT   A1c Lab Results   Component Value Date/Time    Hemoglobin A1c 5.7 (H) 09/09/2014 08:30 AM      Lipid Panel Lab Results   Component Value Date/Time    Cholesterol, total 140 10/09/2018 05:53 PM    HDL Cholesterol 49 10/09/2018 05:53 PM    LDL, calculated 67.2 10/09/2018 05:53 PM    VLDL, calculated 23.8 (H) 10/09/2018 05:53 PM    Triglyceride 119 10/09/2018 05:53 PM    CHOL/HDL Ratio 2.9 10/09/2018 05:53 PM      Thyroid Panel Lab Results   Component Value Date/Time    TSH 1.660 10/09/2018 05:53 PM    TSH 2.720 09/09/2014 08:30 AM        Most Recent UA No results found for: COLOR, APPRN, REFSG, ALEIDA, PROTU, GLUCU, KETU, BILU, BLDU, UROU, LATONIA, LEUKU     Allergies   Allergen Reactions    Chantix [Varenicline] Other (comments)    Codeine Hives    Dilaudid [Hydromorphone (Bulk)] Nausea and Vomiting    Influenza Virus Vaccines Swelling    Medrol [Methylprednisolone] Nausea and Vomiting    Prednisone Nausea and Vomiting    Sulfa (Sulfonamide Antibiotics) Other (comments)     Yeast infection       There is no immunization history on file for this patient. All Labs from Last 24 Hrs:  No results found for this or any previous visit (from the past 24 hour(s)). Discharge Exam:  Patient Vitals for the past 24 hrs:   Temp Pulse Resp BP SpO2   10/11/18 0728 97.6 °F (36.4 °C) 77 18 117/72 96 %   10/11/18 0334 98.1 °F (36.7 °C) 66 18 104/68 96 %   10/10/18 2327 99 °F (37.2 °C) 78 18 114/77 95 %   10/10/18 1917 98.3 °F (36.8 °C) 84 18 113/71 95 %   10/10/18 1430 98.3 °F (36.8 °C) 72 17 107/69 97 %     Oxygen Therapy  O2 Sat (%): 96 % (10/11/18 0728)    Intake/Output Summary (Last 24 hours) at 10/11/18 1100  Last data filed at 10/10/18 1704   Gross per 24 hour   Intake              180 ml   Output                0 ml   Net              180 ml      Patient stated pain improved now 3-4/10. Patient denies any other complaints. General:    Well nourished. Alert. No distress. Eyes:   Normal sclera. Extraocular movements intact. ENT:  Normocephalic, atraumatic. CV:   Regular rate and rhythm. No murmur, rub, or gallop. Lungs:  Clear to auscultation bilaterally. Abdomen: Soft, nontender, nondistended. Bowel sounds normal.   Extremities: Warm and dry. No cyanosis or edema. Neurologic: No gross focal deficits. Psych:  Normal mood and affect. Discharge Info:   Current Discharge Medication List      START taking these medications    Details   diphenhydrAMINE (BENADRYL) 25 mg capsule Take 1 Cap by mouth every six (6) hours for 2 days. Qty: 8 Cap, Refills: 0    Associated Diagnoses: Intractable persistent migraine aura without cerebral infarction and with status migrainosus      metoclopramide HCl (REGLAN) 5 mg tablet Take 1 Tab by mouth Before breakfast, lunch, and dinner for 2 days. Qty: 6 Tab, Refills: 0         CONTINUE these medications which have NOT CHANGED    Details   topiramate (TOPAMAX) 100 mg tablet Take 100 mg by mouth two (2) times a day. biotin 10,000 mcg cap Take 1 Tab by mouth daily. cholecalciferol (VITAMIN D3) 1,000 unit tablet Take 1,000 Units by mouth daily. aspirin (ASPIRIN) 325 mg tablet Take 325 mg by mouth daily. metoprolol tartrate (LOPRESSOR) 25 mg tablet Take 25 mg by mouth two (2) times a day. lamoTRIgine (LAMICTAL XR) 50 mg tr24 ER tablet Take 50 mg by mouth daily. meloxicam (MOBIC) 7.5 mg tablet Take 7.5 mg by mouth daily. rosuvastatin (CRESTOR) 20 mg tablet Take 20 mg by mouth nightly. traZODone (DESYREL) 50 mg tablet Take 50 mg by mouth nightly. ramipril (ALTACE) 2.5 mg capsule Take 2.5 mg by mouth daily. hydrOXYzine pamoate (VISTARIL) 25 mg capsule Take 25 mg by mouth two (2) times daily as needed for Itching. melatonin 3 mg tablet Take 5 mg by mouth nightly. lamoTRIgine (LAMICTAL) 100 mg tablet Take 100 mg by mouth nightly. Indications: BIPOLAR DISORDER IN REMISSION      CYCLOBENZAPRINE HCL (FLEXERIL PO) Take 10 mg by mouth as needed. magnesium oxide 500 mg tab Take 250 mg by mouth daily. cetirizine (ZYRTEC) 10 mg tablet Take  by mouth daily. Associated Diagnoses: Allergic rhinitis, cause unspecified      DULoxetine (CYMBALTA) 60 mg capsule Take 1 Cap by mouth daily. Qty: 30 Cap, Refills: 5    Associated Diagnoses: Generalized anxiety disorder      multivitamin (ONE A DAY) tablet Take 1 Tab by mouth daily. STOP taking these medications       ibuprofen (MOTRIN) 800 mg tablet Comments:   Reason for Stopping:                 Disposition: home  Activity: Activity as tolerated  Diet: Regular Diet    Follow-up Information     Follow up With Details Comments Contact Info    Not On File [facility]   Not On File (62) Patient has a PCP but that physician is not listed in [facility]. Signed:   Ember Cleveland MD

## 2018-10-11 NOTE — DISCHARGE INSTRUCTIONS
Migraine Headache: Care Instructions  Your Care Instructions  Migraines are painful, throbbing headaches that often start on one side of the head. They may cause nausea and vomiting and make you sensitive to light, sound, or smell. Without treatment, migraines can last from 4 hours to a few days. Medicines can help prevent migraines or stop them after they have started. Your doctor can help you find which ones work best for you. Follow-up care is a key part of your treatment and safety. Be sure to make and go to all appointments, and call your doctor if you are having problems. It's also a good idea to know your test results and keep a list of the medicines you take. How can you care for yourself at home? · Do not drive if you have taken a prescription pain medicine. · Rest in a quiet, dark room until your headache is gone. Close your eyes, and try to relax or go to sleep. Don't watch TV or read. · Put a cold, moist cloth or cold pack on the painful area for 10 to 20 minutes at a time. Put a thin cloth between the cold pack and your skin. · Use a warm, moist towel or a heating pad set on low to relax tight shoulder and neck muscles. · Have someone gently massage your neck and shoulders. · Take your medicines exactly as prescribed. Call your doctor if you think you are having a problem with your medicine. You will get more details on the specific medicines your doctor prescribes. · Be careful not to take pain medicine more often than the instructions allow. You could get worse or more frequent headaches when the medicine wears off. To prevent migraines  · Keep a headache diary so you can figure out what triggers your headaches. Avoiding triggers may help you prevent headaches. Record when each headache began, how long it lasted, and what the pain was like.  (Was it throbbing, aching, stabbing, or dull?) Write down any other symptoms you had with the headache, such as nausea, flashing lights or dark spots, or sensitivity to bright light or loud noise. Note if the headache occurred near your period. List anything that might have triggered the headache. Triggers may include certain foods (chocolate, cheese, wine) or odors, smoke, bright light, stress, or lack of sleep. · If your doctor has prescribed medicine for your migraines, take it as directed. You may have medicine that you take only when you get a migraine and medicine that you take all the time to help prevent migraines. ¨ If your doctor has prescribed medicine for when you get a headache, take it at the first sign of a migraine, unless your doctor has given you other instructions. ¨ If your doctor has prescribed medicine to prevent migraines, take it exactly as prescribed. Call your doctor if you think you are having a problem with your medicine. · Find healthy ways to deal with stress. Migraines are most common during or right after stressful times. Take time to relax before and after you do something that has caused a migraine in the past.  · Try to keep your muscles relaxed by keeping good posture. Check your jaw, face, neck, and shoulder muscles for tension. Try to relax them. When you sit at a desk, change positions often. And make sure to stretch for 30 seconds each hour. · Get plenty of sleep and exercise. · Eat meals on a regular schedule. Avoid foods and drinks that often trigger migraines. These include chocolate, alcohol (especially red wine and port), aspartame, monosodium glutamate (MSG), and some additives found in foods (such as hot dogs, swain, cold cuts, aged cheeses, and pickled foods). · Limit caffeine. Don't drink too much coffee, tea, or soda. But don't quit caffeine suddenly. That can also give you migraines. · Do not smoke or allow others to smoke around you. If you need help quitting, talk to your doctor about stop-smoking programs and medicines. These can increase your chances of quitting for good.   · If you are taking birth control pills or hormone therapy, talk to your doctor about whether they are triggering your migraines. When should you call for help? Call 911 anytime you think you may need emergency care. For example, call if:    · You have signs of a stroke. These may include:  ¨ Sudden numbness, paralysis, or weakness in your face, arm, or leg, especially on only one side of your body. ¨ Sudden vision changes. ¨ Sudden trouble speaking. ¨ Sudden confusion or trouble understanding simple statements. ¨ Sudden problems with walking or balance. ¨ A sudden, severe headache that is different from past headaches.    Call your doctor now or seek immediate medical care if:    · You have new or worse nausea and vomiting.     · You have a new or higher fever.     · Your headache gets much worse.    Watch closely for changes in your health, and be sure to contact your doctor if:    · You are not getting better after 2 days (48 hours). Where can you learn more? Go to http://narcisa-prince.info/. Enter F046 in the search box to learn more about \"Migraine Headache: Care Instructions. \"  Current as of: June 4, 2018  Content Version: 11.8  © 3596-4629 [employer]. Care instructions adapted under license by [employer] (which disclaims liability or warranty for this information). If you have questions about a medical condition or this instruction, always ask your healthcare professional. [address] any warranty or liability for your use of this information.     DISCHARGE SUMMARY from Nurse    PATIENT INSTRUCTIONS:    After general anesthesia or intravenous sedation, for 24 hours or while taking prescription Narcotics:  · Limit your activities  · Do not drive and operate hazardous machinery  · Do not make important personal or business decisions  · Do  not drink alcoholic beverages  · If you have not urinated within 8 hours after discharge, please contact your surgeon on call. Report the following to your surgeon:  · Excessive pain, swelling, redness or odor of or around the surgical area  · Temperature over 100.5  · Nausea and vomiting lasting longer than 4 hours or if unable to take medications  · Any signs of decreased circulation or nerve impairment to extremity: change in color, persistent  numbness, tingling, coldness or increase pain  · Any questions    What to do at Home:  Recommended activity: Activity as tolerated, diet as tolerated. *  Please give a list of your current medications to your Primary Care Provider. *  Please update this list whenever your medications are discontinued, doses are      changed, or new medications (including over-the-counter products) are added. *  Please carry medication information at all times in case of emergency situations. These are general instructions for a healthy lifestyle:    No smoking/ No tobacco products/ Avoid exposure to second hand smoke  Surgeon General's Warning:  Quitting smoking now greatly reduces serious risk to your health. Obesity, smoking, and sedentary lifestyle greatly increases your risk for illness    A healthy diet, regular physical exercise & weight monitoring are important for maintaining a healthy lifestyle    You may be retaining fluid if you have a history of heart failure or if you experience any of the following symptoms:  Weight gain of 3 pounds or more overnight or 5 pounds in a week, increased swelling in our hands or feet or shortness of breath while lying flat in bed. Please call your doctor as soon as you notice any of these symptoms; do not wait until your next office visit. Recognize signs and symptoms of STROKE:    F-face looks uneven    A-arms unable to move or move unevenly    S-speech slurred or non-existent    T-time-call 911 as soon as signs and symptoms begin-DO NOT go       Back to bed or wait to see if you get better-TIME IS BRAIN.     Warning Signs of HEART ATTACK Call 911 if you have these symptoms:   Chest discomfort. Most heart attacks involve discomfort in the center of the chest that lasts more than a few minutes, or that goes away and comes back. It can feel like uncomfortable pressure, squeezing, fullness, or pain.  Discomfort in other areas of the upper body. Symptoms can include pain or discomfort in one or both arms, the back, neck, jaw, or stomach.  Shortness of breath with or without chest discomfort.  Other signs may include breaking out in a cold sweat, nausea, or lightheadedness. Don't wait more than five minutes to call 911 - MINUTES MATTER! Fast action can save your life. Calling 911 is almost always the fastest way to get lifesaving treatment. Emergency Medical Services staff can begin treatment when they arrive -- up to an hour sooner than if someone gets to the hospital by car. The discharge information has been reviewed with the patient. The patient verbalized understanding. Discharge medications reviewed with the patient and appropriate educational materials and side effects teaching were provided.   ___________________________________________________________________________________________________________________________________

## 2018-10-11 NOTE — PHYSICIAN ADVISORY
Letter of Determination:  Outpatient Status receiving Observation Services This case was reviewed, and I concur with Outpatient status receiving Observation services. This determination may change depending on further medical information, condition changes of the patient, or treatment requirements. Amy Bartlett MD, [facility], Physician Advisor [address] [place] [address].

## (undated) DEVICE — (D)PREP SKN CHLRAPRP APPL 26ML -- CONVERT TO ITEM 371833

## (undated) DEVICE — SET IRRIG L94IN ID0.281IN W/ 4.5IN DST FLX CONN 2 LD ON OFF

## (undated) DEVICE — DRAPE,U/SHT,SPLIT,FILM,60X84,STERILE: Brand: MEDLINE

## (undated) DEVICE — SET IRRIG DST FLX M CONN

## (undated) DEVICE — NEEDLE HYPO 18GA L1.5IN PNK S STL HUB POLYPR SHLD REG BVL

## (undated) DEVICE — Device

## (undated) DEVICE — SOLUTION IRRIG 3000ML 0.9% SOD CHL FLX CONT 0797208] ICU MEDICAL INC]

## (undated) DEVICE — AMD ANTIMICROBIAL GAUZE SPONGES,12 PLY USP TYPE VII, 0.2% POLYHEXAMETHYLENE BIGUANIDE HCI (PHMB): Brand: CURITY

## (undated) DEVICE — STOCKINETTE,IMPERVIOUS,12X48,STERILE: Brand: MEDLINE

## (undated) DEVICE — SUT ETHLN 3-0 18IN PS2 BLK --

## (undated) DEVICE — SURGICAL PROCEDURE PACK BASIC ST FRANCIS

## (undated) DEVICE — STERILE HOOK LOCK LATEX FREE ELASTIC BANDAGE 6INX5YD: Brand: HOOK LOCK™

## (undated) DEVICE — SINGLE PORT MANIFOLD

## (undated) DEVICE — INTENDED FOR TISSUE SEPARATION, AND OTHER PROCEDURES THAT REQUIRE A SHARP SURGICAL BLADE TO PUNCTURE OR CUT.: Brand: BARD-PARKER ® STAINLESS STEEL BLADES

## (undated) DEVICE — T-DRAPE,EXTREMITY,STERILE: Brand: MEDLINE

## (undated) DEVICE — PADDING CAST W6INXL4YD ST COT COHESIVE HND TEARABLE SPEC

## (undated) DEVICE — 3M™ COBAN™ SELF-ADHERENT WRAP, 1586S, STERILE, 6 IN X 5 YD (15 CM X 4,5 M), 12 ROLLS/CASE: Brand: 3M™ COBAN™

## (undated) DEVICE — DISPOSABLE TOURNIQUET CUFF SINGLE BLADDER, DUAL PORT AND QUICK CONNECT CONNECTOR: Brand: COLOR CUFF

## (undated) DEVICE — SYR LR LCK 1ML GRAD NSAF 30ML --

## (undated) DEVICE — BLADE SHV 3.5MM TOMCAT